# Patient Record
Sex: FEMALE | Race: WHITE | Employment: OTHER | URBAN - METROPOLITAN AREA
[De-identification: names, ages, dates, MRNs, and addresses within clinical notes are randomized per-mention and may not be internally consistent; named-entity substitution may affect disease eponyms.]

---

## 2017-07-07 ENCOUNTER — HOSPITAL ENCOUNTER (INPATIENT)
Age: 67
LOS: 4 days | Discharge: HOME | DRG: 884 | End: 2017-07-11
Attending: PSYCHIATRY & NEUROLOGY | Admitting: PSYCHIATRY & NEUROLOGY
Payer: MEDICARE

## 2017-07-07 PROBLEM — N39.0 UTI (URINARY TRACT INFECTION): Status: ACTIVE | Noted: 2017-07-07

## 2017-07-07 PROBLEM — F25.9 SCHIZOAFFECTIVE DISORDER (HCC): Status: ACTIVE | Noted: 2017-07-07

## 2017-07-07 LAB
GLUCOSE BLD STRIP.AUTO-MCNC: 141 MG/DL (ref 65–100)
SERVICE CMNT-IMP: ABNORMAL

## 2017-07-07 PROCEDURE — 82962 GLUCOSE BLOOD TEST: CPT

## 2017-07-07 PROCEDURE — 74011250637 HC RX REV CODE- 250/637: Performed by: HOSPITALIST

## 2017-07-07 PROCEDURE — 65220000003 HC RM SEMIPRIVATE PSYCH

## 2017-07-07 PROCEDURE — 74011250637 HC RX REV CODE- 250/637: Performed by: PSYCHIATRY & NEUROLOGY

## 2017-07-07 PROCEDURE — 74011250636 HC RX REV CODE- 250/636: Performed by: PSYCHIATRY & NEUROLOGY

## 2017-07-07 RX ORDER — NITROFURANTOIN 25; 75 MG/1; MG/1
100 CAPSULE ORAL 2 TIMES DAILY
Status: DISCONTINUED | OUTPATIENT
Start: 2017-07-07 | End: 2017-07-11 | Stop reason: HOSPADM

## 2017-07-07 RX ORDER — BISMUTH SUBSALICYLATE 262 MG
1 TABLET,CHEWABLE ORAL DAILY
COMMUNITY

## 2017-07-07 RX ORDER — ATORVASTATIN CALCIUM 40 MG/1
40 TABLET, FILM COATED ORAL DAILY
Status: DISCONTINUED | OUTPATIENT
Start: 2017-07-08 | End: 2017-07-11 | Stop reason: HOSPADM

## 2017-07-07 RX ORDER — THERA TABS 400 MCG
1 TAB ORAL DAILY
Status: DISCONTINUED | OUTPATIENT
Start: 2017-07-08 | End: 2017-07-11 | Stop reason: HOSPADM

## 2017-07-07 RX ORDER — CHLORPROMAZINE HYDROCHLORIDE 25 MG/1
25 TABLET, FILM COATED ORAL 3 TIMES DAILY
COMMUNITY

## 2017-07-07 RX ORDER — BENZTROPINE MESYLATE 1 MG/1
1 TABLET ORAL
Status: DISCONTINUED | OUTPATIENT
Start: 2017-07-07 | End: 2017-07-11 | Stop reason: HOSPADM

## 2017-07-07 RX ORDER — LORAZEPAM 2 MG/ML
0.5 INJECTION INTRAMUSCULAR
Status: DISCONTINUED | OUTPATIENT
Start: 2017-07-07 | End: 2017-07-11 | Stop reason: HOSPADM

## 2017-07-07 RX ORDER — GUAIFENESIN 100 MG/5ML
81 LIQUID (ML) ORAL DAILY
COMMUNITY

## 2017-07-07 RX ORDER — IBUPROFEN 200 MG
1 TABLET ORAL
Status: DISCONTINUED | OUTPATIENT
Start: 2017-07-07 | End: 2017-07-11 | Stop reason: HOSPADM

## 2017-07-07 RX ORDER — GLIPIZIDE 5 MG/1
5 TABLET ORAL DAILY
Status: DISCONTINUED | OUTPATIENT
Start: 2017-07-07 | End: 2017-07-11 | Stop reason: HOSPADM

## 2017-07-07 RX ORDER — ZOLPIDEM TARTRATE 5 MG/1
5 TABLET ORAL
Status: DISCONTINUED | OUTPATIENT
Start: 2017-07-07 | End: 2017-07-11 | Stop reason: HOSPADM

## 2017-07-07 RX ORDER — LANOLIN ALCOHOL/MO/W.PET/CERES
325 CREAM (GRAM) TOPICAL
Status: DISCONTINUED | OUTPATIENT
Start: 2017-07-08 | End: 2017-07-11 | Stop reason: HOSPADM

## 2017-07-07 RX ORDER — OLANZAPINE 2.5 MG/1
2.5 TABLET ORAL
Status: DISCONTINUED | OUTPATIENT
Start: 2017-07-07 | End: 2017-07-11 | Stop reason: HOSPADM

## 2017-07-07 RX ORDER — BENZTROPINE MESYLATE 1 MG/ML
1 INJECTION INTRAMUSCULAR; INTRAVENOUS
Status: DISCONTINUED | OUTPATIENT
Start: 2017-07-07 | End: 2017-07-11 | Stop reason: HOSPADM

## 2017-07-07 RX ORDER — GLIPIZIDE 5 MG/1
5 TABLET ORAL DAILY
Status: DISCONTINUED | OUTPATIENT
Start: 2017-07-08 | End: 2017-07-07

## 2017-07-07 RX ORDER — MAGNESIUM SULFATE 100 %
4 CRYSTALS MISCELLANEOUS AS NEEDED
Status: DISCONTINUED | OUTPATIENT
Start: 2017-07-07 | End: 2017-07-11 | Stop reason: HOSPADM

## 2017-07-07 RX ORDER — INSULIN LISPRO 100 [IU]/ML
INJECTION, SOLUTION INTRAVENOUS; SUBCUTANEOUS
Status: DISCONTINUED | OUTPATIENT
Start: 2017-07-07 | End: 2017-07-11 | Stop reason: HOSPADM

## 2017-07-07 RX ORDER — ADHESIVE BANDAGE
30 BANDAGE TOPICAL DAILY PRN
Status: DISCONTINUED | OUTPATIENT
Start: 2017-07-07 | End: 2017-07-11 | Stop reason: HOSPADM

## 2017-07-07 RX ORDER — LORAZEPAM 0.5 MG/1
0.5 TABLET ORAL
Status: DISCONTINUED | OUTPATIENT
Start: 2017-07-07 | End: 2017-07-11 | Stop reason: HOSPADM

## 2017-07-07 RX ORDER — DEXTROSE 50 % IN WATER (D50W) INTRAVENOUS SYRINGE
12.5-25 AS NEEDED
Status: DISCONTINUED | OUTPATIENT
Start: 2017-07-07 | End: 2017-07-07

## 2017-07-07 RX ORDER — MEMANTINE HYDROCHLORIDE 10 MG/1
10 TABLET ORAL 2 TIMES DAILY
Status: DISCONTINUED | OUTPATIENT
Start: 2017-07-07 | End: 2017-07-11 | Stop reason: HOSPADM

## 2017-07-07 RX ORDER — GLIPIZIDE 5 MG/1
5 TABLET ORAL DAILY
COMMUNITY

## 2017-07-07 RX ORDER — IBUPROFEN 400 MG/1
400 TABLET ORAL
Status: DISCONTINUED | OUTPATIENT
Start: 2017-07-07 | End: 2017-07-11 | Stop reason: HOSPADM

## 2017-07-07 RX ORDER — GUAIFENESIN 100 MG/5ML
81 LIQUID (ML) ORAL DAILY
Status: DISCONTINUED | OUTPATIENT
Start: 2017-07-08 | End: 2017-07-11 | Stop reason: HOSPADM

## 2017-07-07 RX ORDER — CHLORPROMAZINE HYDROCHLORIDE 25 MG/1
25 TABLET, FILM COATED ORAL 3 TIMES DAILY
Status: DISCONTINUED | OUTPATIENT
Start: 2017-07-07 | End: 2017-07-07

## 2017-07-07 RX ORDER — CHLORPROMAZINE HYDROCHLORIDE 25 MG/1
25 TABLET, FILM COATED ORAL 2 TIMES DAILY
Status: DISCONTINUED | OUTPATIENT
Start: 2017-07-07 | End: 2017-07-11 | Stop reason: HOSPADM

## 2017-07-07 RX ORDER — MEMANTINE HYDROCHLORIDE 10 MG/1
10 TABLET ORAL 2 TIMES DAILY
Status: ON HOLD | COMMUNITY
End: 2017-07-07

## 2017-07-07 RX ORDER — DONEPEZIL HYDROCHLORIDE 10 MG/1
10 TABLET, FILM COATED ORAL
Status: DISCONTINUED | OUTPATIENT
Start: 2017-07-07 | End: 2017-07-11 | Stop reason: HOSPADM

## 2017-07-07 RX ADMIN — MEMANTINE HYDROCHLORIDE 10 MG: 10 TABLET ORAL at 12:58

## 2017-07-07 RX ADMIN — LORAZEPAM 0.5 MG: 2 INJECTION INTRAMUSCULAR; INTRAVENOUS at 20:44

## 2017-07-07 RX ADMIN — NITROFURANTOIN MONOHYDRATE/MACROCRYSTALLINE 100 MG: 25; 75 CAPSULE ORAL at 12:58

## 2017-07-07 NOTE — PROGRESS NOTES
08:25  Patient admitted to Inpatient geriatric psychiatry unit       Patient denies suicidal ideations  Patient endorses homicidal ideations however cannot say to whom or how. Patient  verbally contracts for safety  Patient is disorganized, circumstantial with flight of ideas. Patient appears to be a poor historian, most of admission information obtained from spouse who reports POA status (asked to bring in paperwork for chart).      Primary Nurse Adrian Wilde and Faizan Granger RN performed a dual skin assessment on this patient Impairment noted- see wound doc flow sheet  Tarun score is 23

## 2017-07-07 NOTE — H&P
INITIAL PSYCHIATRIC EVALUATION         IDENTIFICATION:    Patient Name  Joe Ivey   Date of Birth 1950   [facility] [id number]   Medical Record Number  [id number]      Age  74 y.o. PCP Smith Perez MD   Admit date:  7/7/2017    Room Number  748/01  @ [facility]   Date of Service  7/7/2017            HISTORY         REASON FOR HOSPITALIZATION:  CC: \"cognitive decline and aggression in serttng of \". Pt admitted under a voluntary basis for advancing cerebrovascular dementia with behavioral disturbance  an inability to care for self. HISTORY OF PRESENT ILLNESS:    The patient, Joe Ivey, is a 74 y.o. WHITE [place]  female with a past psychiatric history significant for Dementia, who presents at this time with complaints of (and/or evidence of) the following emotional symptoms: depression. Additional symptomatology include behavioral disturbance and advancing dementia. The above symptoms have been present for years. These symptoms are of severe severity. These symptoms are constant  fleeting in nature. The patient's condition has been precipitated by a UTI and psychosocial stressors (marital problems ). Patient's condition made worse by treatment noncompliance. UDS: negative; BAL=0.     ALLERGIES:  Allergies   Allergen Reactions    Ancef [Cefazolin] Unknown (comments)      could not report allergic reaction    Antihistamine Decongestant [Triprolidine-Pseudoephedrine] Other (comments)      could not report allergic reaction    Antihistamine [Diphenhydramine Hcl] Other (comments)      could not report allergic reaction    Bactrim [Sulfamethoprim] Unknown (comments)      could not report allergic reaction    Benicar [Olmesartan] Unknown (comments)      could not report allergic reaction    Benzocaine Unknown (comments)      could not report allergic reaction    Captopril Unknown (comments)      could not report allergic reaction    Codeine Phosphate Unknown (comments)      could not report allergic reaction    Codeine Sulfate Unknown (comments)      could not report allergic reaction    Demerol [Meperidine] Unknown (comments)      could not report allergic reaction    Dilantin [Phenytoin Sodium Extended] Unknown (comments)      could not report allergic reaction    Diovan [Valsartan] Unknown (comments)      could not report allergic reaction    Doxycycline Unknown (comments)      could not report allergic reaction    Lortab [Hydrocodone-Acetaminophen] Unknown (comments)      could not report allergic reaction    Metformin Unknown (comments)      could not report allergic reaction    Norvasc [Amlodipine] Unknown (comments)      could not report allergic reaction    Parnate [Tranylcypromine] Unknown (comments)      could not report allergic reaction    Penicillins Unknown (comments)      could not report allergic reaction    Percocet [Oxycodone-Acetaminophen] Unknown (comments)      could not report allergic reaction    Pravastatin Other (comments)     Headaches    Robaxin [Methocarbamol] Other (comments)      could not report allergic reaction      MEDICATIONS PRIOR TO ADMISSION:  Prescriptions Prior to Admission   Medication Sig    glipiZIDE (GLUCOTROL) 5 mg tablet Take 5 mg by mouth daily. Indications: type 2 diabetes mellitus    multivitamin (ONE A DAY) tablet Take 1 Tab by mouth daily.  FERROUS SULFATE (IRON PO) Take 1 Tab by mouth daily.  chlorproMAZINE (THORAZINE) 25 mg tablet Take 25 mg by mouth three (3) times daily.  memantine-donepezil (NAMZARIC) 28-10 mg CSpX Take 1 Cap by mouth daily. Indications: DEMENTIA    aspirin 81 mg chewable tablet Take 1 Tab by mouth daily. Indications: anxiety    atorvastatin (LIPITOR) 40 mg tablet Take 1 Tab by mouth daily.  Indications: PREVENTION OF CEREBROVASCULAR ACCIDENT      PAST MEDICAL HISTORY:  Past Medical History:   Diagnosis Date    Chronic kidney disease     stones in past    Diabetes ([address]     Hypertension     Psychiatric disorder     depression     Past Surgical History:   Procedure Laterality Date    HX OTHER SURGICAL      hysterectomy    HX OTHER SURGICAL      gallbladder    HX OTHER SURGICAL      elbows - bilateral screw placement      SOCIAL HISTORY:    Social History     Social History    Marital status:      Spouse name: N/A    Number of children: N/A    Years of education: N/A     Occupational History    Not on file. Social History Main Topics    Smoking status: Never Smoker    Smokeless tobacco: Never Used    Alcohol use No    Drug use: No    Sexual activity: Yes     Partners: Male     Other Topics Concern    Not on file     Social History Narrative    [id number] IMPAIRMENT IN THE CONTEXT OF WORSENING CEREBROVASCULAR DEMENTIA. Patient has had acute mental status change in addition, most likely due to UTI. During past [facility] admissions, 's family, Nursing Director, Social Work, Patient Advocate and her attending psychiatirst ALL concurred that patient needed placement in a SNF. Patient's  was against this recommendation because he believes the patient only needs psychotherapy, and doies not need medications. FAMILY HISTORY: History reviewed. No pertinent family history. Family History   Problem Relation Age of Onset    Diabetes Mother     Diabetes Father        REVIEW OF SYSTEMS:   Psychological ROS: positive for - dementia  Respiratory ROS: no cough, shortness of breath, or wheezing  Cardiovascular ROS: no chest pain or dyspnea on exertion  Pertinent items are noted in the History of Present Illness. All other Systems reviewed and are considered negative.            MENTAL STATUS EXAM & VITALS         MENTAL STATUS EXAM (MSE): MSE FINDINGS ARE WITHIN NORMAL LIMITS (WNL) UNLESS OTHERWISE STATED BELOW. ( ALL OF THE BELOW CATEGORIES OF THE MSE HAVE BEEN REVIEWED (reviewed 7/7/2017) AND UPDATED AS DEEMED APPROPRIATE )  General Presentation age appropriate, evasive   Orientation Alert and Oriented x 1   Vital Signs  See below (reviewed 7/7/2017); Vital Signs (BP, Pulse, & Temp) are within normal limits if not listed below. Gait and Station Stable/steady, no ataxia   Musculoskeletal System No extrapyramidal symptoms (EPS); no abnormal muscular movements or Tardive Dyskinesia (TD); muscle strength and tone are within normal limits   Language No aphasia or dysarthria   Speech:  hypoverbal   Thought Processes concrete; slow rate of thoughts; poor abstract reasoning/computation   Thought Associations blocked    Thought Content paranoid delusions   Suicidal Ideations none   Homicidal Ideations none   Mood:  hostile  and irritable   Affect:  mood-congruent   Memory recent  impaired   Memory remote:  impaired   Concentration/Attention:  poor   Fund of Knowledge below average   Insight:  poor   Reliability poor   Judgment:  poor            VITALS:     Patient Vitals for the past 24 hrs:   Temp Pulse Resp BP SpO2   07/07/17 0825 97.6 °F (36.4 °C) 82 16 127/84 99 %     Wt Readings from Last 3 Encounters:   11/13/16 68.8 kg (151 lb 9.6 oz)     Temp Readings from Last 3 Encounters:   07/07/17 97.6 °F (36.4 °C)   11/18/16 98.1 °F (36.7 °C)   10/30/15 98.4 °F (36.9 °C)     BP Readings from Last 3 Encounters:   07/07/17 127/84   11/18/16 123/81   10/30/15 120/75     Pulse Readings from Last 3 Encounters:   07/07/17 82   11/18/16 92   10/30/15 99            DATA       LABORATORY DATA:  Labs Reviewed - No data to display  No visits with results within 2 Day(s) from this visit. Latest known visit with results is:    Admission on 10/19/2016, Discharged on 11/18/2016   No results displayed because visit has over 200 results.            RADIOLOGY REPORTS:    Results from Hospital Encounter encounter on 10/19/16   XR CHEST PORT   Narrative INDICATION:  AMS     EXAM: Chest single view. COMPARISON: None. Sandra Clement FINDINGS: A single frontal view of the chest at 1525 hours shows clear lungs. The left lung apex is partially obscured by the patient's chin. The heart,  mediastinum and pulmonary vasculature are normal    .  The bony thorax is  unremarkable for age. .         Impression IMPRESSION:  No acute cardiopulmonary disease radiographically. .  . No results found.            MEDICATIONS       ALL MEDICATIONS  Current Facility-Administered Medications   Medication Dose Route Frequency    nitrofurantoin (macrocrystal-monohydrate) (MACROBID) capsule 100 mg  100 mg Oral BID    OLANZapine (ZyPREXA) tablet 2.5 mg  2.5 mg Oral Q6H PRN    ziprasidone (GEODON) 10 mg in sterile water (preservative free) 0.5 mL injection  10 mg IntraMUSCular BID PRN    benztropine (COGENTIN) tablet 1 mg  1 mg Oral BID PRN    benztropine (COGENTIN) injection 1 mg  1 mg IntraMUSCular BID PRN    LORazepam (ATIVAN) injection 0.5 mg  0.5 mg IntraMUSCular Q4H PRN    LORazepam (ATIVAN) tablet 0.5 mg  0.5 mg Oral Q4H PRN    zolpidem (AMBIEN) tablet 5 mg  5 mg Oral QHS PRN    ibuprofen (MOTRIN) tablet 400 mg  400 mg Oral Q8H PRN    magnesium hydroxide (MILK OF MAGNESIA) 400 mg/5 mL oral suspension 30 mL  30 mL Oral DAILY PRN    nicotine (NICODERM CQ) 21 mg/24 hr patch 1 Patch  1 Patch TransDERmal DAILY PRN    [START ON 7/8/2017] atorvastatin (LIPITOR) tablet 40 mg  40 mg Oral DAILY    [START ON 7/8/2017] aspirin chewable tablet 81 mg  81 mg Oral DAILY    [START ON 7/8/2017] therapeutic multivitamin (THERAGRAN) tablet 1 Tab  1 Tab Oral DAILY    [START ON 7/8/2017] ferrous sulfate tablet 325 mg  325 mg Oral DAILY WITH BREAKFAST    insulin lispro (HUMALOG) injection   SubCUTAneous AC&HS    glucose chewable tablet 16 g  4 Tab Oral PRN    glucagon (GLUCAGEN) injection 1 mg  1 mg IntraMUSCular PRN    glipiZIDE (GLUCOTROL) tablet 5 mg  5 mg Oral DAILY    donepezil (ARICEPT) tablet 10 mg  10 mg Oral QHS    memantine (NAMENDA) tablet 10 mg  10 mg Oral BID    dextrose 10 % infusion 125-250 mL  125-250 mL IntraVENous PRN      SCHEDULED MEDICATIONS  Current Facility-Administered Medications   Medication Dose Route Frequency    nitrofurantoin (macrocrystal-monohydrate) (MACROBID) capsule 100 mg  100 mg Oral BID    [START ON 7/8/2017] atorvastatin (LIPITOR) tablet 40 mg  40 mg Oral DAILY    [START ON 7/8/2017] aspirin chewable tablet 81 mg  81 mg Oral DAILY    [START ON 7/8/2017] therapeutic multivitamin (THERAGRAN) tablet 1 Tab  1 Tab Oral DAILY    [START ON 7/8/2017] ferrous sulfate tablet 325 mg  325 mg Oral DAILY WITH BREAKFAST    insulin lispro (HUMALOG) injection   SubCUTAneous AC&HS    glipiZIDE (GLUCOTROL) tablet 5 mg  5 mg Oral DAILY    donepezil (ARICEPT) tablet 10 mg  10 mg Oral QHS    memantine (NAMENDA) tablet 10 mg  10 mg Oral BID                ASSESSMENT & PLAN        The patient Ulysses Sandoval is a 74 y.o.  female who presents at this time for treatment of the following diagnoses:  Patient Active Hospital Problem List:   Dementia (10/19/2016)    Assessment: cognitive decline with verbal and problem solving deficits, due to cerebrovascular DZ    Plan: Namzeric   Schizoaffective disorder ([facility] [address] (7/7/2017)    Assessment: cameron/ depression alternating with psychosis    Plan: christiano          In summary, Ulysses Sandoval presents with a severe exacerbation of the principal diagnosis, Dementia    While on the unit Ulysses Sandoval will be provided with individual, milieu, occupational, group, and substance abuse therapies to address target symptoms as deemed appropriate for the individual patient. I agree with decision to admit patient.  I have spoken to [facility] psychiatric /ED staff regarding the nature of patients's admission at this time. A coordinated, multidisplinary treatment team (includes the nurse, unit pharmcist,  and writer) round was conducted for this initial evaluation with the patient present. The following regarding medications was addressed during rounds with patient:   the risks and benefits of the proposed medication. The patient was given the opportunity to ask questions. Informed consent given to the use of the above medications. I will continue to adjust psychiatric and non-psychiatric medications (see above \"medication\" section and orders section for details) as deemed appropriate & based upon diagnoses and response to treatment. I have reviewed admission (and previous/old) labs and medical tests in the EHR and or transferring hospital documents. I will continue to order blood tests/labs and diagnostic tests as deemed appropriate and review results as they become available (see orders for details). I have reviewed old psychiatric and medical records available in the EHR. I Will order additional psychiatric records from other institutions to further elucidate the nature of patient's psychopathology and review once available. I will gather additional collateral information from friends, family and o/p treatment team to further elucidate the nature of patient's psychopathology and baselline level of psychiatric functioning.         ESTIMATED LENGTH OF STAY:   1-2 days       STRENGTHS:  Access to housing/residential stability and Interpersonal/supportive relationships (family, friends, peers)                      SIGNED:    Todd Jolly MD  7/7/2017

## 2017-07-07 NOTE — PROGRESS NOTES
TRANSFER - IN REPORT:    Verbal report received from Dot RN(name) on Mario Casanova DAVID Lorenzo Du  being received from [email or web address] ED(unit) for routine progression of care      Report consisted of patients Situation, Background, Assessment and   Recommendations(SBAR). Information from the following report(s) SBAR and ED Summary was reviewed with the receiving nurse. Opportunity for questions and clarification was provided. Assessment completed upon patients arrival to unit and care assumed.

## 2017-07-07 NOTE — IP AVS SNAPSHOT
Current Discharge Medication List  
  
START taking these medications Dose & Instructions Dispensing Information Comments Morning Noon Evening Bedtime  
 nitrofurantoin (macrocrystal-monohydrate) 100 mg capsule Commonly known as:  MACROBID Your last dose was: Your next dose is:    
   
   
 Dose:  100 mg Take 1 Cap by mouth two (2) times a day. Indications: BACTERIAL URINARY TRACT INFECTION Quantity:  6 Cap Refills:  0 CONTINUE these medications which have CHANGED Dose & Instructions Dispensing Information Comments Morning Noon Evening Bedtime  
 aspirin 81 mg chewable tablet What changed:  Another medication with the same name was removed. Continue taking this medication, and follow the directions you see here. Your last dose was: Your next dose is:    
   
   
 Dose:  81 mg Take 81 mg by mouth daily. Indications: prevention of cerebrovascular accident Refills:  0 CONTINUE these medications which have NOT CHANGED Dose & Instructions Dispensing Information Comments Morning Noon Evening Bedtime  
 atorvastatin 40 mg tablet Commonly known as:  LIPITOR Your last dose was: Your next dose is:    
   
   
 Dose:  40 mg Take 1 Tab by mouth daily. Indications: PREVENTION OF CEREBROVASCULAR ACCIDENT Quantity:  15 Tab Refills:  0  
     
   
   
   
  
 chlorproMAZINE 25 mg tablet Commonly known as:  THORAZINE Your last dose was: Your next dose is:    
   
   
 Dose:  25 mg Take 25 mg by mouth three (3) times daily. Refills:  0  
     
   
   
   
  
 glipiZIDE 5 mg tablet Commonly known as:  Raf Mitchellr Your last dose was: Your next dose is:    
   
   
 Dose:  5 mg Take 5 mg by mouth daily. Indications: type 2 diabetes mellitus Refills:  0 IRON PO Your last dose was: Your next dose is: Dose:  1 Tab Take 1 Tab by mouth daily. Refills:  0  
     
   
   
   
  
 multivitamin tablet Commonly known as:  ONE A DAY Your last dose was: Your next dose is:    
   
   
 Dose:  1 Tab Take 1 Tab by mouth daily. Refills:  0 NAMZARIC 28-10 mg Cspx Generic drug:  memantine-donepezil Your last dose was: Your next dose is:    
   
   
 Dose:  1 Cap Take 1 Cap by mouth daily. Indications: DEMENTIA Refills:  0 Where to Get Your Medications Information on where to get these meds will be given to you by the nurse or doctor. ! Ask your nurse or doctor about these medications  
  nitrofurantoin (macrocrystal-monohydrate) 100 mg capsule

## 2017-07-07 NOTE — IP AVS SNAPSHOT
[address] 
[phone] Patient: Alex Hong MRN: [id number] :1950 You are allergic to the following Allergen Reactions Ancef (Cefazolin) Unknown (comments)  could not report allergic reaction Antihistamine Decongestant (Triprolidine-Pseudoephedrine) Other (comments)  could not report allergic reaction Antihistamine (Diphenhydramine Hcl) Other (comments)  could not report allergic reaction Bactrim (Sulfamethoprim) Unknown (comments)  could not report allergic reaction Benicar (Olmesartan) Unknown (comments)  could not report allergic reaction Benzocaine Unknown (comments)  could not report allergic reaction Captopril Unknown (comments)  could not report allergic reaction Codeine Phosphate Unknown (comments)  could not report allergic reaction Codeine Sulfate Unknown (comments)  could not report allergic reaction Demerol (Meperidine) Unknown (comments)  could not report allergic reaction Dilantin (Phenytoin Sodium Extended) Unknown (comments)  could not report allergic reaction Diovan (Valsartan) Unknown (comments)  could not report allergic reaction Doxycycline Unknown (comments)  could not report allergic reaction Lortab (Hydrocodone-Acetaminophen) Unknown (comments)  could not report allergic reaction Metformin Unknown (comments)  could not report allergic reaction Norvasc (Amlodipine) Unknown (comments)  could not report allergic reaction Parnate (Tranylcypromine) Unknown (comments)  could not report allergic reaction Penicillins Unknown (comments)  could not report allergic reaction Percocet (Oxycodone-Acetaminophen) Unknown (comments)  could not report allergic reaction Pravastatin Other (comments) Headaches Robaxin (Methocarbamol) Other (comments)  could not report allergic reaction Recent Documentation Weight Breastfeeding? BMI OB Status Smoking Status 70 kg No 28.24 kg/m2 Postmenopausal Never Smoker Emergency Contacts Name Discharge Info Relation Home Work Mobile Laura Zamarripa  Spouse [3] [phone] About your hospitalization You were admitted on:  July 7, 2017 You last received care in the:  [address] [place] [address] You were discharged on:  July 11, 2017 Unit phone number:  [phone] Why you were hospitalized Your primary diagnosis was:  Dementia Your diagnoses also included:  Schizoaffective Disorder (Hcc), Uti (Urinary Tract Infection) Providers Seen During Your Hospitalizations Provider Role Specialty Primary office phone Sean Chow MD Attending Provider Psychiatry [phone] Clif Billings MD Attending Provider Psychiatry [phone] Your Primary Care Physician (PCP) Primary Care Physician Office Phone Office Fax Rashard Gallardo [phone] Follow-up Information Follow up With Details Comments Contact Info Ramona Jones MD Schedule an appointment as soon as possible for a visit  [address] [facility] [address] [place] [address] 
[phone] Current Discharge Medication List  
  
START taking these medications Dose & Instructions Dispensing Information Comments Morning Noon Evening Bedtime  
 nitrofurantoin (macrocrystal-monohydrate) 100 mg capsule Commonly known as:  MACROBID Your last dose was: Your next dose is:    
   
   
 Dose:  100 mg Take 1 Cap by mouth two (2) times a day. Indications: BACTERIAL URINARY TRACT INFECTION Quantity:  6 Cap Refills:  0 CONTINUE these medications which have CHANGED Dose & Instructions Dispensing Information Comments Morning Noon Evening Bedtime  
 aspirin 81 mg chewable tablet What changed:  Another medication with the same name was removed. Continue taking this medication, and follow the directions you see here. Your last dose was: Your next dose is:    
   
   
 Dose:  81 mg Take 81 mg by mouth daily. Indications: prevention of cerebrovascular accident Refills:  0 CONTINUE these medications which have NOT CHANGED Dose & Instructions Dispensing Information Comments Morning Noon Evening Bedtime  
 atorvastatin 40 mg tablet Commonly known as:  LIPITOR Your last dose was: Your next dose is:    
   
   
 Dose:  40 mg Take 1 Tab by mouth daily. Indications: PREVENTION OF CEREBROVASCULAR ACCIDENT Quantity:  15 Tab Refills:  0  
     
   
   
   
  
 chlorproMAZINE 25 mg tablet Commonly known as:  THORAZINE Your last dose was: Your next dose is:    
   
   
 Dose:  25 mg Take 25 mg by mouth three (3) times daily. Refills:  0  
     
   
   
   
  
 glipiZIDE 5 mg tablet Commonly known as:  Dorimisael Petersono Your last dose was: Your next dose is:    
   
   
 Dose:  5 mg Take 5 mg by mouth daily. Indications: type 2 diabetes mellitus Refills:  0 IRON PO Your last dose was: Your next dose is:    
   
   
 Dose:  1 Tab Take 1 Tab by mouth daily. Refills:  0  
     
   
   
   
  
 multivitamin tablet Commonly known as:  ONE A DAY Your last dose was: Your next dose is:    
   
   
 Dose:  1 Tab Take 1 Tab by mouth daily. Refills:  0 NAMZARIC 28-10 mg Cspx Generic drug:  memantine-donepezil Your last dose was: Your next dose is:    
   
   
 Dose:  1 Cap Take 1 Cap by mouth daily. Indications: DEMENTIA Refills:  0 Where to Get Your Medications Information on where to get these meds will be given to you by the nurse or doctor. ! Ask your nurse or doctor about these medications  
  nitrofurantoin (macrocrystal-monohydrate) 100 mg capsule Discharge Instructions DISCHARGE SUMMARY 
 
[address] [facility] : 1950 MRN: [id number] The patient Lakisha Pham exhibits the ability to control behavior in a less restrictive environment. Patient's level of functioning is improving. No assaultive/destructive behavior has been observed for the past 24 hours. No suicidal/homicidal threat or behavior has been observed for the past 24 hours. There is no evidence of serious medication side effects. Patient has not been in physical or protective restraints for at least the past 24 hours. If weapons involved, how are they secured? No weapons involved Is patient aware of and in agreement with discharge plan?  is aware - patient is confused Arrangements for medication:  Prescriptions given to patient. Copy of discharge instructions to  provider?:  Yes - sent to PCP Arrangements for transportation home:   to  Keep all follow up appointments as scheduled, continue to take prescribed medications per physician instructions. Mental health crisis number:  022 or your local mental health crisis line number at [phone] DISCHARGE SUMMARY from Nurse The following personal items are in your possession at time of discharge: 
 
Dental Appliances: None Visual Aid: Glasses, With patient Home Medications: None Jewelry: None Clothing: None Other Valuables: Personal toiletries (shampoo) Personal Items Sent to Safe: None PATIENT INSTRUCTIONS: 
 
What to do at Home: 
Recommended activity: Activity as tolerated. If you experience any of the following symptoms:  Overwhelming anxiety or depression, thoughts of hurting yourself or others, please follow up with 911 or your local mental health crisis line number at [phone]. *  Please give a list of your current medications to your Primary Care Provider. *  Please update this list whenever your medications are discontinued, doses are 
    changed, or new medications (including over-the-counter products) are added. *  Please carry medication information at all times in case of emergency situations. These are general instructions for a healthy lifestyle: No smoking/ No tobacco products/ Avoid exposure to second hand smoke Surgeon General's Warning:  Quitting smoking now greatly reduces serious risk to your health. Obesity, smoking, and sedentary lifestyle greatly increases your risk for illness A healthy diet, regular physical exercise & weight monitoring are important for maintaining a healthy lifestyle You may be retaining fluid if you have a history of heart failure or if you experience any of the following symptoms:  Weight gain of 3 pounds or more overnight or 5 pounds in a week, increased swelling in our hands or feet or shortness of breath while lying flat in bed. Please call your doctor as soon as you notice any of these symptoms; do not wait until your next office visit. Recognize signs and symptoms of STROKE: 
 
F-face looks uneven A-arms unable to move or move unevenly S-speech slurred or non-existent T-time-call 911 as soon as signs and symptoms begin-DO NOT go Back to bed or wait to see if you get better-TIME IS BRAIN. Warning Signs of HEART ATTACK Call 911 if you have these symptoms: 
? Chest discomfort. Most heart attacks involve discomfort in the center of the chest that lasts more than a few minutes, or that goes away and comes back. It can feel like uncomfortable pressure, squeezing, fullness, or pain. ? Discomfort in other areas of the upper body. Symptoms can include pain or discomfort in one or both arms, the back, neck, jaw, or stomach. ? Shortness of breath with or without chest discomfort. ? Other signs may include breaking out in a cold sweat, nausea, or lightheadedness. Don't wait more than five minutes to call [address]! Fast action can save your life. Calling 911 is almost always the fastest way to get lifesaving treatment. Emergency Medical Services staff can begin treatment when they arrive  up to an hour sooner than if someone gets to the hospital by car. The discharge information has been reviewed with the patient and spouse. The {PATIENT PARENT GUARDIAN:93993} verbalized understanding. Discharge medications reviewed with the {Dishcarge meds reviewed PIJU:[id number]} and appropriate educational materials and side effects teaching were provided. Discharge Orders None Datacratic Announcement We are excited to announce that we are making your provider's discharge notes available to you in Datacratic. You will see these notes when they are completed and signed by the physician that discharged you from your recent hospital stay. If you have any questions or concerns about any information you see in Datacratic, please call the Health Information Department where you were seen or reach out to your Primary Care Provider for more information about your plan of care. Introducing [facility]! [facility] introduces Datacratic patient portal. Now you can access parts of your medical record, email your doctor's office, and request medication refills online. 1. In your internet browser, go to [email or web address] 2. Click on the First Time User? Click Here link in the Sign In box. You will see the New Member Sign Up page. 3. Enter your Datacratic Access Code exactly as it appears below.  You will not need to use this code after youve completed the sign-up process. If you do not sign up before the expiration date, you must request a new code. · TrustTeam Access Code: [email or web address] Expires: 10/9/2017  2:02 PM 
 
4. Enter the last four digits of your Social Security Number (xxxx) and Date of Birth (mm/dd/yyyy) as indicated and click Submit. You will be taken to the next sign-up page. 5. Create a TrustTeam ID. This will be your TrustTeam login ID and cannot be changed, so think of one that is secure and easy to remember. 6. Create a TrustTeam password. You can change your password at any time. 7. Enter your Password Reset Question and Answer. This can be used at a later time if you forget your password. 8. Enter your e-mail address. You will receive e-mail notification when new information is available in [address] 9. Click Sign Up. You can now view and download portions of your medical record. 10. Click the Download Summary menu link to download a portable copy of your medical information. If you have questions, please visit the Frequently Asked Questions section of the TrustTeam website. Remember, TrustTeam is NOT to be used for urgent needs. For medical emergencies, dial 911. Now available from your iPhone and Android! General Information Please provide this summary of care documentation to your next provider. Patient Signature:  ____________________________________________________________ Date:  ____________________________________________________________  
  
Linda Lucas Provider Signature:  ____________________________________________________________ Date:  ____________________________________________________________
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
## 2017-07-07 NOTE — CONSULTS
Medical H and P/Clearance for Psychiatry    Primary Care Provider: Devi Patel MD  Source of Information: Patient     History of Presenting Illness:   Darcy Gunderson is a 74 y.o. female with PMhx of DM, Diet Controlled HTN, Depression, Dementia admitted for psychosis. We are consulted for medical management. Pt  Confused, poor historian. No reports of fever, chills, chest pain, sob, n/v/d, abdominal pain, dysuria. Review of Systems:  Review of systems not obtained due to patient factors. Past Medical History:   Diagnosis Date    Chronic kidney disease     stones in past    Diabetes ([facility]     Hypertension     Psychiatric disorder     depression      Past Surgical History:   Procedure Laterality Date    HX OTHER SURGICAL      hysterectomy    HX OTHER SURGICAL      gallbladder    HX OTHER SURGICAL      elbows - bilateral screw placement     Prior to Admission medications    Medication Sig Start Date End Date Taking? Authorizing Provider   glipiZIDE (GLUCOTROL) 5 mg tablet Take 5 mg by mouth daily. Indications: type 2 diabetes mellitus   Yes Historical Provider   multivitamin (ONE A DAY) tablet Take 1 Tab by mouth daily. Yes Historical Provider   FERROUS SULFATE (IRON PO) Take 1 Tab by mouth daily. Yes Historical Provider   aspirin 81 mg chewable tablet Take 81 mg by mouth daily. Indications: prevention of cerebrovascular accident   Yes Historical Provider   chlorproMAZINE (THORAZINE) 25 mg tablet Take 25 mg by mouth three (3) times daily. Historical Provider   memantine-donepezil [facility]) 28-10 mg CSpX Take 1 Cap by mouth daily. Indications: DEMENTIA    Historical Provider   atorvastatin (LIPITOR) 40 mg tablet Take 1 Tab by mouth daily.  Indications: PREVENTION OF CEREBROVASCULAR ACCIDENT 11/18/16   Valeria Isaac MD     Allergies   Allergen Reactions    Ancef [Cefazolin] Unknown (comments)      could not report allergic reaction    Antihistamine Decongestant [Triprolidine-Pseudoephedrine] Other (comments)      could not report allergic reaction    Antihistamine [Diphenhydramine Hcl] Other (comments)      could not report allergic reaction    Bactrim [Sulfamethoprim] Unknown (comments)      could not report allergic reaction    Benicar [Olmesartan] Unknown (comments)      could not report allergic reaction    Benzocaine Unknown (comments)      could not report allergic reaction    Captopril Unknown (comments)      could not report allergic reaction    Codeine Phosphate Unknown (comments)      could not report allergic reaction    Codeine Sulfate Unknown (comments)      could not report allergic reaction    Demerol [Meperidine] Unknown (comments)      could not report allergic reaction    Dilantin [Phenytoin Sodium Extended] Unknown (comments)      could not report allergic reaction    Diovan [Valsartan] Unknown (comments)      could not report allergic reaction    Doxycycline Unknown (comments)      could not report allergic reaction    Lortab [Hydrocodone-Acetaminophen] Unknown (comments)      could not report allergic reaction    Metformin Unknown (comments)      could not report allergic reaction    Norvasc [Amlodipine] Unknown (comments)      could not report allergic reaction    Parnate [Tranylcypromine] Unknown (comments)      could not report allergic reaction    Penicillins Unknown (comments)      could not report allergic reaction    Percocet [Oxycodone-Acetaminophen] Unknown (comments)      could not report allergic reaction    Pravastatin Other (comments)     Headaches    Robaxin [Methocarbamol] Other (comments)      could not report allergic reaction        SOCIAL HISTORY:     Smoking history:   None X   Former    Chronic      Alcohol history:   None X   Social Chronic      CODE STATUS:  DNR    Full X   Other      Objective:     Physical Exam:     Visit Vitals    /85    Pulse 84    Temp 97.3 °F (36.3 °C)    Resp 16    SpO2 98%    Breastfeeding No      O2 Device: Room air    General:  Alert, cooperative, no distress, appears stated age. Head:  Normocephalic,   HEENT:  Conjunctivae/corneas clear, EOMI       Lungs:   Clear to auscultation bilaterally. Heart:  Regular rate and rhythm, S1, S2 normal, no murmur   Abdomen:   Soft, non-tender. Bowel sounds normal. No masses   Extremities: Extremities normal, atraumatic, no edema. Skin: Skin color, texture, turgor normal. No rashes or lesions   Neurologic: CNII-XII intact. EKG: not done    Data Review:     24 Hour Results:  No results found for this or any previous visit (from the past 24 hour(s)). Imaging:     Assessment/Plan:     1. DM - resume glipizide, add sliding scale    2. HTN - controlled   - monitor  - c/w ASA    3. HLD - c/w Statin    4. Hypothyroidism - c/w synthroid     5. Likely Asymptomatic Bacteriuria, no UTI   - on Macrobid per primary team, would d/c abx due to no fever, or leukocytosis per labs on charts. -  requesting urology consult. Primary team to consider placing consult for [facility]. I do not think she has UTI at this time. 6. Dementia - c/w Namenda/Aricept    7.  Psychosis, c/w psych care        Signed By: Jessica Rose MD     July 7, 2017

## 2017-07-07 NOTE — PROGRESS NOTES
[address] [facility] on unit to see pt for H&P. Webb Barthel MD met with pt and pt's . Verbal order with read back given: order urology consult for today; if urology not able to see pt today pt to follow up with urology  out pt. Reason for consult: frequent UTI. Charge nurse aware. Order placed. Pt presents with increased irritability with visit from . Pt becomes restless, cursing, appears angry and confused. Redirection, education, therapeutic communication tolerated by pt. Pt refused meals today. Eating peanut butter crackers. Drinking water and diet ginger ale.

## 2017-07-07 NOTE — BH NOTES
PSYCHOSOCIAL ASSESSMENT  :Patient identifying info:  Georgina Vail is a 74 y.o., female admitted 2017  8:49 AM     Presenting problem and precipitating factors: Patient was sent to [facility] on a TDO due to inability to care for self and [facility]. Current psychiatric providers and contact info: no current provider     Previous psychiatric services/providers and response to treatment: She was hospitalized here at [facility] once for Dementia and unable to care for self       Substance abuse history:    Social History   Substance Use Topics    Smoking status: Never Smoker    Smokeless tobacco: Never Used    Alcohol use No       Family constellation:  - sister     Is significant other involved?        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

## 2017-07-07 NOTE — PROGRESS NOTES
Problem: Altered Thought Process (Adult/Pediatric) --  Goal to be met by0 7/14/2017  Goal: *STG: Participates in treatment plan  Outcome: Not Progressing Towards Goal  Variance: Patient Condition  Patient denies SI. When asked about HI patient states she would like to hurt someone but cannot say who and began to talk about clothing. Patient is confused and disoriented. Goal: *STG: Absence of lethality  Outcome: Progressing Towards Goal  Patient displays no aggressive behavior and has been appropriate on the unit. Problem: Falls - Risk of - Goal to be met by 07/14/2017  Goal: *Absence of falls  Outcome: Progressing Towards Goal  Patient remains free from falls. Will continue to monitor and assist as needed.        [address] [facility]  Master Treatment Plan for [place] Lisseth    Date Treatment Plan Initiated: 07/07/2017    Treatment Plan Modalities:  Type of Modality Amount  (x minutes) Frequency (x/week) Duration (x days) Name of Responsible Staff   Community & wrap-up meetings to encourage peer interactions 15 7 1 Windy Verma, RN    Group psychotherapy to assist in building coping skills and internal controls 60 7 1 Edson Gray LCSW   Therapeutic activity groups to build coping skills 60 7 1 Carolyn Turner LCSW   Psychoeducation in group setting to address:   Medication education   15 7 1 Annie Torres, RN    Coping skills         Relaxation techniques         Symptom management         Discharge planning   15 7 [facility]   Spirituality    60 132 [facility]         Recovery/AA/NA         Physician medication management   15 7 1 Dr. Chanell Weiss

## 2017-07-07 NOTE — BH NOTES
Admission reviewed for medical necessity. Will follow with care [facility].

## 2017-07-07 NOTE — BH NOTES
GROUP THERAPY PROGRESS NOTE    Enrico Lowe participated in a Afternoon Activity Group on the Geriatric Unit, with a focus on group singing and mood lifting. Group time: 60 minutes. Personal goal for participation: To increase the capacity to shift ones mood and share in group singing. Goal orientation: The patient will be able to enjoy music through either listening or singing. Group therapy participation: When prompted, this patient minimally participated in the group. Therapeutic interventions reviewed and discussed: The group members were asked to select songs from the unit songbook and either listen or sing along. Impression of participation: The patient began the group by standing but decided to sit and listen after the first five minutes. She may have been mouthing some of the words but she did not sing along with most of the songs. Her role and participation was largely passive.

## 2017-07-07 NOTE — BH NOTES
1555:  Urology Consult order called to [facility] at [phone]. Message left requesting consult today for frequent UTI. Advised that Dr. Esther Jean is the on-call physician for consults. Awaiting call back for confirmation. 1620:  Call received back conforming that Dr. Jermaine Hull will see patient today.

## 2017-07-07 NOTE — BH NOTES
GROUP THERAPY PROGRESS NOTE    Venda Dancer Ezella [facility] participated in a Morning Process Group on the Geriatric Unit, with a focus identifying feelings, planning for the day, and singing. Group time: 45 minutes. Personal goal for participation: To increase the capacity to shift ones mood, prepare for the day, and share in group singing. Goal orientation: The patient will be able to prepare for the day through group singing. Group therapy participation: When prompted, this patient minimally participated in the group. Therapeutic interventions reviewed and discussed: The group members were introduce themselves by first names and participate in group singing as a way to increase their oxygen and blood flow and begin their day on a positive note. They were also asked to join in singing several songs. Impression of participation: The patient sat in group but did not share with her peers. She shared her name, when asked, with the undersigned. It was difficult to know if she were experiencing any SI/HI or overt psychosis on the basis of her limited participation in group. She was mostly silent and stared at the floor. Her affect was depressed. Her mood was withdrawn and passive.

## 2017-07-08 LAB
GLUCOSE BLD STRIP.AUTO-MCNC: 104 MG/DL (ref 65–100)
GLUCOSE BLD STRIP.AUTO-MCNC: 142 MG/DL (ref 65–100)
SERVICE CMNT-IMP: ABNORMAL
SERVICE CMNT-IMP: ABNORMAL

## 2017-07-08 PROCEDURE — 74011636637 HC RX REV CODE- 636/637: Performed by: HOSPITALIST

## 2017-07-08 PROCEDURE — 74011250637 HC RX REV CODE- 250/637: Performed by: HOSPITALIST

## 2017-07-08 PROCEDURE — 74011250636 HC RX REV CODE- 250/636: Performed by: PSYCHIATRY & NEUROLOGY

## 2017-07-08 PROCEDURE — 74011250637 HC RX REV CODE- 250/637: Performed by: PSYCHIATRY & NEUROLOGY

## 2017-07-08 PROCEDURE — 65220000003 HC RM SEMIPRIVATE PSYCH

## 2017-07-08 PROCEDURE — 82962 GLUCOSE BLOOD TEST: CPT

## 2017-07-08 PROCEDURE — 74011000250 HC RX REV CODE- 250: Performed by: PSYCHIATRY & NEUROLOGY

## 2017-07-08 RX ADMIN — FERROUS SULFATE TAB 325 MG (65 MG ELEMENTAL FE) 325 MG: 325 (65 FE) TAB at 08:14

## 2017-07-08 RX ADMIN — OLANZAPINE 2.5 MG: 2.5 TABLET, FILM COATED ORAL at 16:39

## 2017-07-08 RX ADMIN — WATER 10 MG: 1 INJECTION INTRAMUSCULAR; INTRAVENOUS; SUBCUTANEOUS at 08:38

## 2017-07-08 RX ADMIN — MEMANTINE HYDROCHLORIDE 10 MG: 10 TABLET ORAL at 21:43

## 2017-07-08 RX ADMIN — ATORVASTATIN CALCIUM 40 MG: 40 TABLET, FILM COATED ORAL at 08:14

## 2017-07-08 RX ADMIN — NITROFURANTOIN MONOHYDRATE/MACROCRYSTALLINE 100 MG: 25; 75 CAPSULE ORAL at 08:14

## 2017-07-08 RX ADMIN — THERA TABS 1 TABLET: TAB at 08:14

## 2017-07-08 RX ADMIN — ZOLPIDEM TARTRATE 5 MG: 5 TABLET ORAL at 21:43

## 2017-07-08 RX ADMIN — DONEPEZIL HYDROCHLORIDE 10 MG: 10 TABLET, FILM COATED ORAL at 21:43

## 2017-07-08 RX ADMIN — MEMANTINE HYDROCHLORIDE 10 MG: 10 TABLET ORAL at 08:14

## 2017-07-08 RX ADMIN — GLIPIZIDE 5 MG: 5 TABLET ORAL at 08:14

## 2017-07-08 RX ADMIN — INSULIN LISPRO 2 UNITS: 100 INJECTION, SOLUTION INTRAVENOUS; SUBCUTANEOUS at 08:12

## 2017-07-08 RX ADMIN — CHLORPROMAZINE HYDROCHLORIDE 25 MG: 25 TABLET, SUGAR COATED ORAL at 08:14

## 2017-07-08 RX ADMIN — NITROFURANTOIN MONOHYDRATE/MACROCRYSTALLINE 100 MG: 25; 75 CAPSULE ORAL at 21:43

## 2017-07-08 RX ADMIN — LORAZEPAM 0.5 MG: 2 INJECTION INTRAMUSCULAR; INTRAVENOUS at 06:50

## 2017-07-08 RX ADMIN — ASPIRIN 81 MG 81 MG: 81 TABLET ORAL at 08:14

## 2017-07-08 RX ADMIN — CHLORPROMAZINE HYDROCHLORIDE 25 MG: 25 TABLET, SUGAR COATED ORAL at 18:00

## 2017-07-08 NOTE — BH NOTES
PRN Medication Documentation    Specific patient behavior that led to need for PRN medication: anxiety,irritable\"talking too herself,pacing\"  Staff interventions attempted prior to PRN being given: EMOTIONAL SUPPORT  PRN medication given: [id number]  Patient response/effectiveness of PRN medication: fair

## 2017-07-08 NOTE — BH NOTES
Out of bed around 8 am this morning with incontinent brief only on  Attempted to assist patient putting on gown, patient attempted to grab and pinch staff  Talking in world salads  Attempting to enter peers rooms  Geodon 10mg IM administered for severe agitation, physical aggression,  psychois at 0838, at 0930 pt lest restless although severely labile and unpredictable in her attempts to grab and pinch staff hands  disoriented times 4  Pt is not resting quietly in the dining room   Pt will not allow staff to assist in any ADLs and patient had no purposeful interactions

## 2017-07-08 NOTE — BH NOTES
0455  Pt got oob for brp; voided in toilet. While nurse went to get fresh pull-ups, pt wandered into 446 saying it was her room. Pt posturing, cussing, & threatening staff (scratched gloved hand of nurse). Escorted back into her room. Pt insisting on changing her own pull-up. Monitoring continues. 0602  Pt refusing AM labs. PRN Medication Documentation    Specific patient behavior that led to need for PRN medication: 0640  Pt standing in room by bed, having a BM. Pt got hostile & threatening when nurse offered to help clean it off floor & get her fresh pull-ups. Pt threatened to strike RN. Pt said \"That (BM) is OK, it's just Cheerios. \" Pt picking some of BM up off floor. Staff interventions attempted prior to PRN being given: Attempted reorienting pt, distraction, emotional support. PRN medication given: 0650  Ativan 0.5 mg IM given in pt's left upper arm--Pt tolerated well (2 other staff assisted holding pt & Security remained at doorway.)  Patient response/effectiveness of PRN medication: 0700 BM was removed from room. Pt calmer, but still cussing, wanting staff to leave her alone. Monitoring continues.

## 2017-07-08 NOTE — PROGRESS NOTES
Consult dictated [id number]    No urinary symptoms to suggest UTI from what I can tell although history of recent positive culture which was treated. Culture urine if symptoms develop but in the absence of symptoms positive culture more consistent with asymptomatic bacteruria and no treatment indicated. Recall PRN.

## 2017-07-08 NOTE — BH NOTES
1540:  Patient received as she is wandering the unit mumbling. She is soft-spoken and cooperative with staff at present. She is redirectable when escorted from another patient's room and voices no complaints or concerns at this time. Will maintain q 15 minute safety checks. 1745:  Patient assisted to the bathroom before dinner, but attempts made to obtain the ordered U/A were not successful. Patient was not able to fully cooperate with attempts to catch urine. Will continue to try to get the specimen. 1850:  Patient continues to be restless and disoriented. She is intrusive at times, touching and pushing other patients. She is very labile and pinches/scratches/punches when frustrated. Will continue to monitor.

## 2017-07-08 NOTE — BH NOTES
PSYCHIATRIC PROGRESS NOTE         Patient Name  Lakisha Pham   Date of Birth 1950   [facility] [id number]   Medical Record Number  [id number]      Age  74 y.o. PCP Domi Moon MD   Admit date:  7/7/2017    Room Number  748/01  @ [facility]   Date of Service  7/8/2017          PSYCHOTHERAPY SESSION NOTE:  Length of psychotherapy session: 45 minutes    Main condition/diagnosis/issues treated during session today, 7/8/2017 : self care, anxiety, coping skills    I employed Cognitive Behavioral therapy techniques, Reality-Oriented psychotherapy, as well as supportive psychotherapy in regards to various ongoing psychosocial stressors, including the following: pre-admission and current problems; medical issues; and stress of hospitalization. Interpersonal relationship issues and psychodynamic conflicts explored. Attempts made to alleviate maladaptive patterns. Overall, patient is not progressing    Treatment Plan Update (reviewed an updated 7/8/2017) : I will modify psychotherapy tx plan by implementing more stress management strategies, building upon cognitive behavioral techniques, increasing coping skills, as well as shoring up psychological defenses). An extended energy and skill set was needed to engage pt in psychotherapy due to some of the following: resistiveness, complexity, negativity, confrontational nature, hostile behaviors, and/or severe abnormalities in thought processes/psychosis resulting in the loss of expressive/receptive language communication skills. E & M PROGRESS NOTE:         HISTORY       CC:  \"aggression R/O UTI\"  HISTORY OF PRESENT ILLNESS/INTERVAL HISTORY:  (reviewed/updated 7/8/2017). per initial evaluation:     Lakisha Pham presents/reports/evidences the following emotional symptoms today, 7/8/2017:agitation. The above symptoms have been present for years. These symptoms are of severe severity.  The symptoms are constant  in nature. Additional symptomatology and features include agitation and anger outbursts. SIDE EFFECTS: (reviewed/updated 7/8/2017)  None reported or admitted to.   No noted toxicity with use of Depakote/Tegretol/lithium/Clozaril/TCAs   ALLERGIES:(reviewed/updated 7/8/2017)  Allergies   Allergen Reactions    Ancef [Cefazolin] Unknown (comments)      could not report allergic reaction    Antihistamine Decongestant [Triprolidine-Pseudoephedrine] Other (comments)      could not report allergic reaction    Antihistamine [Diphenhydramine Hcl] Other (comments)      could not report allergic reaction    Bactrim [Sulfamethoprim] Unknown (comments)      could not report allergic reaction    Benicar [Olmesartan] Unknown (comments)      could not report allergic reaction    Benzocaine Unknown (comments)      could not report allergic reaction    Captopril Unknown (comments)      could not report allergic reaction    Codeine Phosphate Unknown (comments)      could not report allergic reaction    Codeine Sulfate Unknown (comments)      could not report allergic reaction    Demerol [Meperidine] Unknown (comments)      could not report allergic reaction    Dilantin [Phenytoin Sodium Extended] Unknown (comments)      could not report allergic reaction    Diovan [Valsartan] Unknown (comments)      could not report allergic reaction    Doxycycline Unknown (comments)      could not report allergic reaction    Lortab [Hydrocodone-Acetaminophen] Unknown (comments)      could not report allergic reaction    Metformin Unknown (comments)      could not report allergic reaction    Norvasc [Amlodipine] Unknown (comments)      could not report allergic reaction    Parnate [Tranylcypromine] Unknown (comments)      could not report allergic reaction    Penicillins Unknown (comments)  could not report allergic reaction    Percocet [Oxycodone-Acetaminophen] Unknown (comments)      could not report allergic reaction    Pravastatin Other (comments)     Headaches    Robaxin [Methocarbamol] Other (comments)      could not report allergic reaction      MEDICATIONS PRIOR TO ADMISSION:(reviewed/updated 7/8/2017)  Prescriptions Prior to Admission   Medication Sig    glipiZIDE (GLUCOTROL) 5 mg tablet Take 5 mg by mouth daily. Indications: type 2 diabetes mellitus    multivitamin (ONE A DAY) tablet Take 1 Tab by mouth daily.  FERROUS SULFATE (IRON PO) Take 1 Tab by mouth daily.  aspirin 81 mg chewable tablet Take 81 mg by mouth daily. Indications: prevention of cerebrovascular accident    chlorproMAZINE (THORAZINE) 25 mg tablet Take 25 mg by mouth three (3) times daily.  memantine-donepezil (NAMZARIC) 28-10 mg CSpX Take 1 Cap by mouth daily. Indications: DEMENTIA    atorvastatin (LIPITOR) 40 mg tablet Take 1 Tab by mouth daily. Indications: PREVENTION OF CEREBROVASCULAR ACCIDENT      PAST MEDICAL HISTORY: Past medical history from the initial psychiatric evaluation has been reviewed (reviewed/updated 7/8/2017) with no additional updates (I asked patient and no additional past medical history provided). Past Medical History:   Diagnosis Date    Chronic kidney disease     stones in past    Diabetes ([facility] [address]     Hypertension     Psychiatric disorder     depression     Past Surgical History:   Procedure Laterality Date    HX OTHER SURGICAL      hysterectomy    HX OTHER SURGICAL      gallbladder    HX OTHER SURGICAL      elbows - bilateral screw placement      SOCIAL HISTORY: Social history from the initial psychiatric evaluation has been reviewed (reviewed/updated 7/8/2017) with no additional updates (I asked patient and no additional social history provided).  Social History     Social History    Marital status:      Spouse name: N/A    Number of children: N/A  Years of education: N/A     Occupational History    Not on file. Social History Main Topics    Smoking status: Never Smoker    Smokeless tobacco: Never Used    Alcohol use No    Drug use: No    Sexual activity: Yes     Partners: Male     Other Topics Concern    Not on file     Social History Narrative    [id number] IMPAIRMENT IN THE CONTEXT OF WORSENING CEREBROVASCULAR DEMENTIA. Patient has had acute mental status change in addition, most likely due to UTI. During past [facility] admissions, 's family, Nursing Director, Social Work, Patient Advocate and her attending psychiatirst ALL concurred that patient needed placement in a SNF. Patient's  was against this recommendation because he believes the patient only needs psychotherapy, and doies not need medications. FAMILY HISTORY: Family history from the initial psychiatric evaluation has been reviewed (reviewed/updated 7/8/2017) with no additional updates (I asked patient and no additional family history provided). Family History   Problem Relation Age of Onset    Diabetes Mother     Diabetes Father        REVIEW OF SYSTEMS: (reviewed/updated 7/8/2017)  Appetite:no change from normal   Sleep: no change   All other Review of Systems: Psychological ROS: positive for - anxiety  Cardiovascular ROS: no chest pain or dyspnea on exertion  Gastrointestinal ROS: no abdominal pain, change in bowel habits, or black or bloody stools         [facility] (MSE):    MSE FINDINGS ARE WITHIN NORMAL LIMITS (WNL) UNLESS OTHERWISE STATED BELOW. ( ALL OF THE BELOW CATEGORIES OF THE MSE HAVE BEEN REVIEWED (reviewed 7/8/2017) AND UPDATED AS DEEMED APPROPRIATE )  General Presentation older than stated age, uncooperative   Orientation oriented to time, place and person   Vital Signs  See below (reviewed 7/8/2017);  Vital Signs (BP, Pulse, & Temp) are within normal limits if not listed below. Gait and Station Stable/steady, no ataxia   Musculoskeletal System No extrapyramidal symptoms (EPS); no abnormal muscular movements or Tardive Dyskinesia (TD); muscle strength and tone are within normal limits   Language No aphasia or dysarthria   Speech:  hypoverbal   Thought Processes concrete; slow rate of thoughts; poor abstract reasoning/computation   Thought Associations goal directed   Thought Content free of delusions   Suicidal Ideations none   Homicidal Ideations none   Mood:  angry, hostile  and irritable   Affect:  mood-congruent   Memory recent  poor   Memory remote:  impaired   Concentration/Attention:  inattentive   Fund of Knowledge below average   Insight:  poor   Reliability poor   Judgment:  limited          VITALS:     Patient Vitals for the past 24 hrs:   Temp Pulse Resp BP SpO2   07/08/17 1300 - - 18 - -   07/08/17 0758 98.6 °F (37 °C) 91 18 141/80 -   07/07/17 1600 97.9 °F (36.6 °C) 87 16 (!) 152/91 98 %     Wt Readings from Last 3 Encounters:   11/13/16 68.8 kg (151 lb 9.6 oz)     Temp Readings from Last 3 Encounters:   07/08/17 98.6 °F (37 °C)   11/18/16 98.1 °F (36.7 °C)   10/30/15 98.4 °F (36.9 °C)     BP Readings from Last 3 Encounters:   07/08/17 141/80   11/18/16 123/81   10/30/15 120/75     Pulse Readings from Last 3 Encounters:   07/08/17 91   11/18/16 92   10/30/15 99            DATA     LABORATORY DATA:(reviewed/updated 7/8/2017)  Recent Results (from the past 24 hour(s))   GLUCOSE, POC    Collection Time: 07/07/17  4:33 PM   Result Value Ref Range    Glucose (POC) 141 (H) 65 - 100 mg/dL    Performed by [employer]    GLUCOSE, POC    Collection Time: 07/08/17  7:41 AM   Result Value Ref Range    Glucose (POC) 142 (H) 65 - 100 mg/dL    Performed by Emma Recinos      No results found for: VALF2, VALAC, VALP, VALPR, DS6, CRBAM, CRBAMP, CARB2, XCRBAM  No results found for: LITHM   RADIOLOGY REPORTS:(reviewed/updated 7/8/2017)  No results found. MEDICATIONS     ALL MEDICATIONS:   Current Facility-Administered Medications   Medication Dose Route Frequency    nitrofurantoin (macrocrystal-monohydrate) (MACROBID) capsule 100 mg  100 mg Oral BID    OLANZapine (ZyPREXA) tablet 2.5 mg  2.5 mg Oral Q6H PRN    ziprasidone (GEODON) 10 mg in sterile water (preservative free) 0.5 mL injection  10 mg IntraMUSCular BID PRN    benztropine (COGENTIN) tablet 1 mg  1 mg Oral BID PRN    benztropine (COGENTIN) injection 1 mg  1 mg IntraMUSCular BID PRN    LORazepam (ATIVAN) injection 0.5 mg  0.5 mg IntraMUSCular Q4H PRN    LORazepam (ATIVAN) tablet 0.5 mg  0.5 mg Oral Q4H PRN    zolpidem (AMBIEN) tablet 5 mg  5 mg Oral QHS PRN    ibuprofen (MOTRIN) tablet 400 mg  400 mg Oral Q8H PRN    magnesium hydroxide (MILK OF MAGNESIA) 400 mg/5 mL oral suspension 30 mL  30 mL Oral DAILY PRN    nicotine (NICODERM CQ) 21 mg/24 hr patch 1 Patch  1 Patch TransDERmal DAILY PRN    atorvastatin (LIPITOR) tablet 40 mg  40 mg Oral DAILY    aspirin chewable tablet 81 mg  81 mg Oral DAILY    therapeutic multivitamin (THERAGRAN) tablet 1 Tab  1 Tab Oral DAILY    ferrous sulfate tablet 325 mg  325 mg Oral DAILY WITH BREAKFAST    insulin lispro (HUMALOG) injection   SubCUTAneous AC&HS    glucose chewable tablet 16 g  4 Tab Oral PRN    glucagon (GLUCAGEN) injection 1 mg  1 mg IntraMUSCular PRN    glipiZIDE (GLUCOTROL) tablet 5 mg  5 mg Oral DAILY    donepezil (ARICEPT) tablet 10 mg  10 mg Oral QHS    memantine (NAMENDA) tablet 10 mg  10 mg Oral BID    dextrose 10 % infusion 125-250 mL  125-250 mL IntraVENous PRN    chlorproMAZINE (THORAZINE) tablet 25 mg  25 mg Oral BID      SCHEDULED MEDICATIONS:   Current Facility-Administered Medications   Medication Dose Route Frequency    nitrofurantoin (macrocrystal-monohydrate) (MACROBID) capsule 100 mg  100 mg Oral BID    atorvastatin (LIPITOR) tablet 40 mg  40 mg Oral DAILY    aspirin chewable tablet 81 mg  81 mg Oral DAILY    therapeutic multivitamin (THERAGRAN) tablet 1 Tab  1 Tab Oral DAILY    ferrous sulfate tablet 325 mg  325 mg Oral DAILY WITH BREAKFAST    insulin lispro (HUMALOG) injection   SubCUTAneous AC&HS    glipiZIDE (GLUCOTROL) tablet 5 mg  5 mg Oral DAILY    donepezil (ARICEPT) tablet 10 mg  10 mg Oral QHS    memantine (NAMENDA) tablet 10 mg  10 mg Oral BID    chlorproMAZINE (THORAZINE) tablet 25 mg  25 mg Oral BID          ASSESSMENT & PLAN     DIAGNOSES REQUIRING ACTIVE TREATMENT AND MONITORING: (reviewed/updated 7/8/2017)  Patient Active Hospital Problem List:   Dementia (10/19/2016)    Assessment:cognitive, logical, language decline    Plan: continue current care   Schizoaffective disorder ([facility] [address] (7/7/2017)    Assessment: nila/depression alternating with psychosis    Plan: continue current care   UTI (urinary tract infection) (7/7/2017)    Assessment:bacteria and leukocyte esterase positive urine    Plan: repeat ua      PATIENT'S SPOUSE IS HER POWER OF  AND HAS STATED HE WANTS NO MEDICATION CHANGES OTHER THAN WHAT THE PATIENT IS CURRENTLY TAKING. In summary, Lolis Villeda, is a 74 y.o.  female who presents with a severe exacerbation of the principal diagnosis of Dementia  Patient's condition is worsening/not improving/not stable . Patient requires continued inpatient hospitalization for further stabilization, safety monitoring and medication management. I will continue to coordinate the provision of individual, milieu, occupational, group, and substance abuse therapies to address target symptoms/diagnoses as deemed appropriate for the individual patient. A coordinated, multidisplinary treatment team round was conducted with the patient (this team consists of the nurse, psychiatric unit pharmcist,  and writer).      Complete current electronic health record for patient has been reviewed today including consultant notes, ancillary staff notes, nurses and psychiatric tech notes.    Suicide risk assessment completed and patient deemed to be of low risk for suicide at this time. The following regarding medications was addressed during rounds with patient:   the risks and benefits of the proposed medication. The patient was given the opportunity to ask questions. Informed consent given to the use of the above medications. Will continue to adjust psychiatric and non-psychiatric medications (see above \"medication\" section and orders section for details) as deemed appropriate & based upon diagnoses and response to treatment. I will continue to order blood tests/labs and diagnostic tests as deemed appropriate and review results as they become available (see orders for details and above listed lab/test results). I will order psychiatric records from previous [facility] hospitals to further elucidate the nature of patient's psychopathology and review once available. I will gather additional collateral information from friends, family and o/p treatment team to further elucidate the nature of patient's psychopathology and baselline level of psychiatric functioning. I certify that this patient's inpatient psychiatric hospital services furnished since the previous certification were, and continue to be, required for treatment that could reasonably be expected to improve the patient's condition, or for diagnostic study, and that the patient continues to need, on a daily basis, active treatment furnished directly by or requiring the supervision of inpatient psychiatric facility personnel. In addition, the hospital records show that services furnished were intensive treatment services, admission or related services, or equivalent services.     EXPECTED DISCHARGE DATE/DAY: TBD     DISPOSITION: Home       Signed By:   Hunter Dubois MD  7/8/2017

## 2017-07-08 NOTE — BH NOTES
Behavioral Health Interdisciplinary Rounds     Patient Name: Melchor Lewis  Age: 74 y.o. Room/Bed:  748/01  Primary Diagnosis: Dementia   Admission Status: Involuntary Commitment     Readmission within 30 days: yes--transferred from another [place] [id number] in place: yes-- to bring papers  Patient requires a blocked bed: yes          Reason for blocked bed: Contact Precautions+aggressive behaviors towards men    VTE Prophylaxis: Yes--ASA  Mobility needs/Fall risk: yes    Nutritional Plan: no  Consults: Urologist saw--he said if UTI symptoms reoccur, call him back, otherwise no treatment indicated         Labs/Testing due today?: yes    Sleep hours:  6.5+       Participation in Care/Groups:  no  Medication Compliant?: Refusing Psychiatric Medications and Refusing Medical Medications  PRNS (last 24 hours): Antianxiety (IM Ativan)    Restraints (last 24 hours):  no  Substance Abuse:  no  CIWA (range last 24 hours): na COWS (range last 24 hours): na  Alcohol screening (AUDIT) completed -  AUDIT Score: 0  If applicable, date SBIRT discussed in treatment team AND documented: na  Tobacco - patient is a smoker: no   Date tobacco education completed by RN: john  24 hour chart check complete: yes     Patient goal(s) for today:   Treatment team focus/goals: Attempt to get urine sample  LOS:  1  Expected LOS: TBD  Psychiatric Advanced Care Directives -  No  Name of Decision maker if patient has Psychiatric Care Directive: None   Patient was offered information, patient declined.    Financial concerns/prescription coverage: [facility] Medicare  Date of last family contact: 7/7/17      Family requesting physician contact today:  Yes-- wants to talk with doctor ASAP  Discharge plan: Return home with  when ready for discharge      Outpatient provider(s): Dr. Ramona Jones (PCP)    Participating treatment team members: Annie Joshi MSW; Dr. Avtar Lane MD; Iza Dean Ellie Crespo

## 2017-07-08 NOTE — CONSULTS
[address]   [address] [facility], [facility]   [address] [facility]       Name:  David Davis   MR#:  [id number]   :  1950   Account #:  [de-identified]    Date of Consultation:  2017   Date of Adm:  2017       REASON FOR CONSULTATION: Recurrent urinary tract infection. HISTORY:  The patient is a 58-year-old female with a history of   dementia and psychosis. She is current admitted to the psychiatric   floor at [facility] and recent was at [facility]  from what I   understand. Apparently, there has been history of sporadic urinary tract   infections. I was told by the nursing staff that a urine culture at [facility]   grew E. Coli, but the details of this are very unclear. The patient is a   very poor historian and is clearly confused, however, reports no pain   nor any urinary symptoms. Apparently, the patient's    requested urology consultation for recurrent infections. I do not see   any recent urinalyses with the current admission. There is a urine   culture from 2006, which grew E. Coli, and from what I am   told, a recent culture grew E. Coli. PAST MEDICAL HISTORY:  Psychiatric disorder, hypertension, and   diabetes. PAST SURGICAL HISTORY:  Hysterectomy, cholecystectomy, as well   as orthopedic procedures. CURRENT MEDICATIONS:  Reviewed and listed in the [facility] ALLERGIES:     1. ANCEF    2. ANTIHISTAMINES. REVIEW OF SYSTEMS:  A 12-point review of systems is unable to be   reliably obtained. PHYSICAL EXAMINATION:     VITAL SIGNS:  She is afebrile, blood pressure 152/91, heart rate is 87. GENERAL:  She is confused and somewhat agitated. She denies any   pain or voiding symptoms. She has no apparent CVA tenderness. ABDOMEN:  Soft and benign. LABORATORY DATA:  No recent labs are available with the current   admission. Creatinine is 0.99 in November. No relevant imaging is in   the [facility] system. ASSESSMENT AND PLAN: This is a 64-year-old female who is not a   reliable historian. Apparently, there have been sporadic urinary tract   infections, although the details are unclear. At the present time, there   is no suggestion of symptomatic infection. In the absence of any   symptoms, I would not recommend any further studies. Certainly, if   she were to have any voiding symptoms, such as burning or any other   complaints, I would recommend urine culture and then that could be   treated accordingly. Please call if things change or if there are any   questions.           Sabrina Morris MD DPM / MONY   D:  07/08/2017   00:53   T:  07/08/2017   14:05   Job #:  [id number]

## 2017-07-08 NOTE — PROGRESS NOTES
Problem: Altered Thought Process (Adult/Pediatric)  Goal: *STG: Remains safe in hospital  Outcome: Progressing Towards Goal  Patient remains safe in hospital. Flat affect, non-cooperative, altered mental status, confused, irritable, paranoid. Patient does not take re-direction well. Patient is med compliant, ate 25% of breakfast, refused lunch, currently asleep in chair in dining room. Remains on Q15 min safety checks.

## 2017-07-08 NOTE — PROGRESS NOTES
Problem: Altered Thought Process (Adult/Pediatric)  Goal: *STG: Absence of lethality  Outcome: Not Progressing Towards Goal  Patient has been verbally and physically aggressive with staff, other patients and a consulting MD this evening. She is difficult to redirect and has refused her scheduled medications.

## 2017-07-08 NOTE — PROGRESS NOTES
Problem: Aggression and Hostility (Behavioral Health)  Goal: *Express anger or hostility appropriately (without verbal or physical aggression)  Outcome: Not Progressing Towards Goal  Pt cursing and

## 2017-07-09 LAB
GLUCOSE BLD STRIP.AUTO-MCNC: 148 MG/DL (ref 65–100)
GLUCOSE BLD STRIP.AUTO-MCNC: 150 MG/DL (ref 65–100)
GLUCOSE BLD STRIP.AUTO-MCNC: 158 MG/DL (ref 65–100)
SERVICE CMNT-IMP: ABNORMAL

## 2017-07-09 PROCEDURE — 74011250637 HC RX REV CODE- 250/637: Performed by: HOSPITALIST

## 2017-07-09 PROCEDURE — 74011250637 HC RX REV CODE- 250/637: Performed by: PSYCHIATRY & NEUROLOGY

## 2017-07-09 PROCEDURE — 82962 GLUCOSE BLOOD TEST: CPT

## 2017-07-09 PROCEDURE — 65220000003 HC RM SEMIPRIVATE PSYCH

## 2017-07-09 RX ADMIN — MEMANTINE HYDROCHLORIDE 10 MG: 10 TABLET ORAL at 08:07

## 2017-07-09 RX ADMIN — CHLORPROMAZINE HYDROCHLORIDE 25 MG: 25 TABLET, SUGAR COATED ORAL at 08:06

## 2017-07-09 RX ADMIN — ZOLPIDEM TARTRATE 5 MG: 5 TABLET ORAL at 21:46

## 2017-07-09 RX ADMIN — OLANZAPINE 2.5 MG: 2.5 TABLET, FILM COATED ORAL at 16:48

## 2017-07-09 RX ADMIN — CHLORPROMAZINE HYDROCHLORIDE 25 MG: 25 TABLET, SUGAR COATED ORAL at 18:00

## 2017-07-09 RX ADMIN — THERA TABS 1 TABLET: TAB at 08:06

## 2017-07-09 RX ADMIN — FERROUS SULFATE TAB 325 MG (65 MG ELEMENTAL FE) 325 MG: 325 (65 FE) TAB at 08:07

## 2017-07-09 RX ADMIN — GLIPIZIDE 5 MG: 5 TABLET ORAL at 08:14

## 2017-07-09 RX ADMIN — NITROFURANTOIN MONOHYDRATE/MACROCRYSTALLINE 100 MG: 25; 75 CAPSULE ORAL at 21:02

## 2017-07-09 RX ADMIN — ATORVASTATIN CALCIUM 40 MG: 40 TABLET, FILM COATED ORAL at 08:06

## 2017-07-09 RX ADMIN — ASPIRIN 81 MG 81 MG: 81 TABLET ORAL at 08:07

## 2017-07-09 RX ADMIN — MEMANTINE HYDROCHLORIDE 10 MG: 10 TABLET ORAL at 21:02

## 2017-07-09 RX ADMIN — NITROFURANTOIN MONOHYDRATE/MACROCRYSTALLINE 100 MG: 25; 75 CAPSULE ORAL at 08:06

## 2017-07-09 RX ADMIN — DONEPEZIL HYDROCHLORIDE 10 MG: 10 TABLET, FILM COATED ORAL at 21:02

## 2017-07-09 NOTE — BH NOTES
PSYCHIATRIC PROGRESS NOTE         Patient Name  Hina Mchugh   Date of Birth 1950   [facility] [id number]   Medical Record Number  [id number]      Age  74 y.o. PCP Kate Pulido MD   Admit date:  7/7/2017    Room Number  748/01  @ [facility]   Date of Service  7/9/2017          PSYCHOTHERAPY SESSION NOTE:  Length of psychotherapy session: 45 minutes    Main condition/diagnosis/issues treated during session today, 7/9/2017 : self care, anxiety, coping skills    I employed Cognitive Behavioral therapy techniques, Reality-Oriented psychotherapy, as well as supportive psychotherapy in regards to various ongoing psychosocial stressors, including the following: pre-admission and current problems; medical issues; and stress of hospitalization. Interpersonal relationship issues and psychodynamic conflicts explored. Attempts made to alleviate maladaptive patterns. Overall, patient is not progressing    Treatment Plan Update (reviewed an updated 7/9/2017) : I will modify psychotherapy tx plan by implementing more stress management strategies, building upon cognitive behavioral techniques, increasing coping skills, as well as shoring up psychological defenses). An extended energy and skill set was needed to engage pt in psychotherapy due to some of the following: resistiveness, complexity, negativity, confrontational nature, hostile behaviors, and/or severe abnormalities in thought processes/psychosis resulting in the loss of expressive/receptive language communication skills. E & M PROGRESS NOTE:         HISTORY       CC:  \"aggression R/O UTI\"  HISTORY OF PRESENT ILLNESS/INTERVAL HISTORY:  (reviewed/updated 7/9/2017). per initial evaluation:     Hina Mchugh presents/reports/evidences the following emotional symptoms today, 7/9/2017:agitation. The above symptoms have been present for years. These symptoms are of severe severity.  The symptoms are constant  in nature. Additional symptomatology and features include agitation and anger outbursts. 7/9/17- Very aggressive and oppositional. Is deliberately not urinating in hat to test urine sample. Poor insight. SIDE EFFECTS: (reviewed/updated 7/9/2017)  None reported or admitted to.   No noted toxicity with use of Depakote/Tegretol/lithium/Clozaril/TCAs   ALLERGIES:(reviewed/updated 7/9/2017)  Allergies   Allergen Reactions    Ancef [Cefazolin] Unknown (comments)      could not report allergic reaction    Antihistamine Decongestant [Triprolidine-Pseudoephedrine] Other (comments)      could not report allergic reaction    Antihistamine [Diphenhydramine Hcl] Other (comments)      could not report allergic reaction    Bactrim [Sulfamethoprim] Unknown (comments)      could not report allergic reaction    Benicar [Olmesartan] Unknown (comments)      could not report allergic reaction    Benzocaine Unknown (comments)      could not report allergic reaction    Captopril Unknown (comments)      could not report allergic reaction    Codeine Phosphate Unknown (comments)      could not report allergic reaction    Codeine Sulfate Unknown (comments)      could not report allergic reaction    Demerol [Meperidine] Unknown (comments)      could not report allergic reaction    Dilantin [Phenytoin Sodium Extended] Unknown (comments)      could not report allergic reaction    Diovan [Valsartan] Unknown (comments)      could not report allergic reaction    Doxycycline Unknown (comments)      could not report allergic reaction    Lortab [Hydrocodone-Acetaminophen] Unknown (comments)      could not report allergic reaction    Metformin Unknown (comments)      could not report allergic reaction    Norvasc [Amlodipine] Unknown (comments)      could not report allergic reaction    Parnate [Tranylcypromine] Unknown (comments)      could not report allergic reaction    Penicillins Unknown (comments)      could not report allergic reaction    Percocet [Oxycodone-Acetaminophen] Unknown (comments)      could not report allergic reaction    Pravastatin Other (comments)     Headaches    Robaxin [Methocarbamol] Other (comments)      could not report allergic reaction      MEDICATIONS PRIOR TO ADMISSION:(reviewed/updated 7/9/2017)  Prescriptions Prior to Admission   Medication Sig    glipiZIDE (GLUCOTROL) 5 mg tablet Take 5 mg by mouth daily. Indications: type 2 diabetes mellitus    multivitamin (ONE A DAY) tablet Take 1 Tab by mouth daily.  FERROUS SULFATE (IRON PO) Take 1 Tab by mouth daily.  aspirin 81 mg chewable tablet Take 81 mg by mouth daily. Indications: prevention of cerebrovascular accident    chlorproMAZINE (THORAZINE) 25 mg tablet Take 25 mg by mouth three (3) times daily.  memantine-donepezil (NAMZARIC) 28-10 mg CSpX Take 1 Cap by mouth daily. Indications: DEMENTIA    atorvastatin (LIPITOR) 40 mg tablet Take 1 Tab by mouth daily. Indications: PREVENTION OF CEREBROVASCULAR ACCIDENT      PAST MEDICAL HISTORY: Past medical history from the initial psychiatric evaluation has been reviewed (reviewed/updated 7/9/2017) with no additional updates (I asked patient and no additional past medical history provided).    Past Medical History:   Diagnosis Date    Chronic kidney disease     stones in past    Diabetes ([facility] [address]     Hypertension     Psychiatric disorder     depression     Past Surgical History:   Procedure Laterality Date    HX OTHER SURGICAL      hysterectomy    HX OTHER SURGICAL      gallbladder    HX OTHER SURGICAL      elbows - bilateral screw placement      SOCIAL HISTORY: Social history from the initial psychiatric evaluation has been reviewed (reviewed/updated 7/9/2017) with no additional updates (I asked patient and no additional social history provided). Social History     Social History    Marital status:      Spouse name: N/A    Number of children: N/A    Years of education: N/A     Occupational History    Not on file. Social History Main Topics    Smoking status: Never Smoker    Smokeless tobacco: Never Used    Alcohol use No    Drug use: No    Sexual activity: Yes     Partners: Male     Other Topics Concern    Not on file     Social History Narrative    [id number] IMP[facility] IN THE CONTEXT OF WORSENING CEREBROVASCULAR DEMENTIA. Patient has had acute mental status change in addition, most likely due to UTI. During past [facility] admissions, 's family, Nursing Director, Social Work, Patient Advocate and her attending psychiatirst ALL concurred that patient needed placement in a SNF. Patient's  was against this recommendation because he believes the patient only needs psychotherapy, and doies not need medications. FAMILY HISTORY: Family history from the initial psychiatric evaluation has been reviewed (reviewed/updated 7/9/2017) with no additional updates (I asked patient and no additional family history provided).    Family History   Problem Relation Age of Onset    Diabetes Mother     Diabetes Father        REVIEW OF SYSTEMS: (reviewed/updated 7/9/2017)  Appetite:no change from normal   Sleep: no change   All other Review of Systems: Psychological ROS: positive for - anxiety  Cardiovascular ROS: no chest pain or dyspnea on exertion  Gastrointestinal ROS: no abdominal pain, change in bowel habits, or black or bloody stools         [facility] ([facility]    MSE FINDINGS ARE WITHIN NORMAL LIMITS (WNL) UNLESS OTHERWISE STATED BELOW. ( ALL OF THE BELOW CATEGORIES OF THE MSE HAVE BEEN REVIEWED (reviewed 7/9/2017) AND UPDATED AS DEEMED APPROPRIATE )  General Presentation older than stated age, uncooperative Orientation oriented to time, place and person   Vital Signs  See below (reviewed 7/9/2017); Vital Signs (BP, Pulse, & Temp) are within normal limits if not listed below.    Gait and Station Stable/steady, no ataxia   Musculoskeletal System No extrapyramidal symptoms (EPS); no abnormal muscular movements or Tardive Dyskinesia (TD); muscle strength and tone are within normal limits   Language No aphasia or dysarthria   Speech:  hypoverbal   Thought Processes concrete; slow rate of thoughts; poor abstract reasoning/computation   Thought Associations goal directed   Thought Content free of delusions   Suicidal Ideations none   Homicidal Ideations none   Mood:  angry, hostile  and irritable   Affect:  mood-congruent   Memory recent  poor   Memory remote:  impaired   Concentration/Attention:  inattentive   Fund of Knowledge below average   Insight:  poor   Reliability poor   Judgment:  limited          VITALS:     Patient Vitals for the past 24 hrs:   Temp Pulse Resp BP   07/09/17 0932 97.7 °F (36.5 °C) 96 18 134/85     Wt Readings from Last 3 Encounters:   07/09/17 70 kg (154 lb 6.4 oz)   11/13/16 68.8 kg (151 lb 9.6 oz)     Temp Readings from Last 3 Encounters:   07/09/17 97.7 °F (36.5 °C)   11/18/16 98.1 °F (36.7 °C)   10/30/15 98.4 °F (36.9 °C)     BP Readings from Last 3 Encounters:   07/09/17 134/85   11/18/16 123/81   10/30/15 120/75     Pulse Readings from Last 3 Encounters:   07/09/17 96   11/18/16 92   10/30/15 99            DATA     LABORATORY DATA:(reviewed/updated 7/9/2017)  Recent Results (from the past 24 hour(s))   GLUCOSE, POC    Collection Time: 07/08/17  3:50 PM   Result Value Ref Range    Glucose (POC) 104 (H) 65 - 100 mg/dL    Performed by [facility] [address] [place] [address], POC    Collection Time: 07/09/17  8:13 AM   Result Value Ref Range    Glucose (POC) 148 (H) 65 - 100 mg/dL    Performed by Neftaly Alfaro    GLUCOSE, POC    Collection Time: 07/09/17 12:03 PM   Result Value Ref Range    Glucose (POC) 158 (H) 65 - 100 mg/dL    Performed by Shelley Tarango      No results found for: VALF2, VALAC, VALP, VALPR, DS6, CRBAM, CRBAMP, CARB2, XCRBAM  No results found for: LITHM   RADIOLOGY REPORTS:(reviewed/updated 7/9/2017)  No results found.        MEDICATIONS     ALL MEDICATIONS:   Current Facility-Administered Medications   Medication Dose Route Frequency    nitrofurantoin (macrocrystal-monohydrate) (MACROBID) capsule 100 mg  100 mg Oral BID    OLANZapine (ZyPREXA) tablet 2.5 mg  2.5 mg Oral Q6H PRN    ziprasidone (GEODON) 10 mg in sterile water (preservative free) 0.5 mL injection  10 mg IntraMUSCular BID PRN    benztropine (COGENTIN) tablet 1 mg  1 mg Oral BID PRN    benztropine (COGENTIN) injection 1 mg  1 mg IntraMUSCular BID PRN    LORazepam (ATIVAN) injection 0.5 mg  0.5 mg IntraMUSCular Q4H PRN    LORazepam (ATIVAN) tablet 0.5 mg  0.5 mg Oral Q4H PRN    zolpidem (AMBIEN) tablet 5 mg  5 mg Oral QHS PRN    ibuprofen (MOTRIN) tablet 400 mg  400 mg Oral Q8H PRN    magnesium hydroxide (MILK OF MAGNESIA) 400 mg/5 mL oral suspension 30 mL  30 mL Oral DAILY PRN    nicotine (NICODERM CQ) 21 mg/24 hr patch 1 Patch  1 Patch TransDERmal DAILY PRN    atorvastatin (LIPITOR) tablet 40 mg  40 mg Oral DAILY    aspirin chewable tablet 81 mg  81 mg Oral DAILY    therapeutic multivitamin (THERAGRAN) tablet 1 Tab  1 Tab Oral DAILY    ferrous sulfate tablet 325 mg  325 mg Oral DAILY WITH BREAKFAST    insulin lispro (HUMALOG) injection   SubCUTAneous AC&HS    glucose chewable tablet 16 g  4 Tab Oral PRN    glucagon (GLUCAGEN) injection 1 mg  1 mg IntraMUSCular PRN    glipiZIDE (GLUCOTROL) tablet 5 mg  5 mg Oral DAILY    donepezil (ARICEPT) tablet 10 mg  10 mg Oral QHS    memantine (NAMENDA) tablet 10 mg  10 mg Oral BID    dextrose 10 % infusion 125-250 mL  125-250 mL IntraVENous PRN    chlorproMAZINE (THORAZINE) tablet 25 mg  25 mg Oral BID      SCHEDULED MEDICATIONS:   Current Facility-Administered Medications Medication Dose Route Frequency    nitrofurantoin (macrocrystal-monohydrate) (MACROBID) capsule 100 mg  100 mg Oral BID    atorvastatin (LIPITOR) tablet 40 mg  40 mg Oral DAILY    aspirin chewable tablet 81 mg  81 mg Oral DAILY    therapeutic multivitamin (THERAGRAN) tablet 1 Tab  1 Tab Oral DAILY    ferrous sulfate tablet 325 mg  325 mg Oral DAILY WITH BREAKFAST    insulin lispro (HUMALOG) injection   SubCUTAneous AC&HS    glipiZIDE (GLUCOTROL) tablet 5 mg  5 mg Oral DAILY    donepezil (ARICEPT) tablet 10 mg  10 mg Oral QHS    memantine (NAMENDA) tablet 10 mg  10 mg Oral BID    chlorproMAZINE (THORAZINE) tablet 25 mg  25 mg Oral BID          ASSESSMENT & PLAN     DIAGNOSES REQUIRING ACTIVE TREATMENT AND MONITORING: (reviewed/updated 7/9/2017)  Patient Active Hospital Problem List:   Dementia (10/19/2016)    Assessment:cognitive, logical, language decline    Plan: continue current care   Schizoaffective disorder ([facility] [address] (7/7/2017)    Assessment: nila/depression alternating with psychosis    Plan: continue current care   UTI (urinary tract infection) (7/7/2017)    Assessment:bacteria and leukocyte esterase positive urine    Plan: repeat ua      PATIENT'S SPOUSE IS HER POWER OF  AND HAS STATED HE WANTS NO MEDICATION CHANGES OTHER THAN WHAT THE PATIENT IS CURRENTLY TAKING. In summary, Breezy Azul, is a 74 y.o.  female who presents with a severe exacerbation of the principal diagnosis of Dementia  Patient's condition is worsening/not improving/not stable . Patient requires continued inpatient hospitalization for further stabilization, safety monitoring and medication management. I will continue to coordinate the provision of individual, milieu, occupational, group, and substance abuse therapies to address target symptoms/diagnoses as deemed appropriate for the individual patient.   A coordinated, multidisplinary treatment team round was conducted with the patient (this team consists of the nurse, psychiatric unit pharmcist,  and writer). Complete current electronic health record for patient has been reviewed today including consultant notes, ancillary staff notes, nurses and psychiatric tech notes. Suicide risk assessment completed and patient deemed to be of low risk for suicide at this time. The following regarding medications was addressed during rounds with patient:   the risks and benefits of the proposed medication. The patient was given the opportunity to ask questions. Informed consent given to the use of the above medications. Will continue to adjust psychiatric and non-psychiatric medications (see above \"medication\" section and orders section for details) as deemed appropriate & based upon diagnoses and response to treatment. I will continue to order blood tests/labs and diagnostic tests as deemed appropriate and review results as they become available (see orders for details and above listed lab/test results). I will order psychiatric records from previous [facility] hospitals to further elucidate the nature of patient's psychopathology and review once available. I will gather additional collateral information from friends, family and o/p treatment team to further elucidate the nature of patient's psychopathology and baselline level of psychiatric functioning. I certify that this patient's inpatient psychiatric hospital services furnished since the previous certification were, and continue to be, required for treatment that could reasonably be expected to improve the patient's condition, or for diagnostic study, and that the patient continues to need, on a daily basis, active treatment furnished directly by or requiring the supervision of inpatient psychiatric facility personnel.  In addition, the hospital records show that services furnished were intensive treatment services, admission or related services, or equivalent services.     EXPECTED DISCHARGE DATE/DAY: TBD     DISPOSITION: Home       Signed By:   Maude Pickens MD  7/9/2017

## 2017-07-09 NOTE — PROGRESS NOTES
Problem: Aggression and Hostility (Behavioral Health)  Goal: *Demonstrate ability to comply with simple direction  Outcome: Not Progressing Towards Goal  Variance: Patient slowly responding to demonstrating the ability to comply with simple direction. Patient has labile affect, mood congruent, delusions of grandeur, paranoid, disorganized thoughts, poor insight, patient talks to self, at times is very restless, patient is refusing insulin, otherwise is med compliant, and meal compliant. Remains on Q15 min safety checks.

## 2017-07-09 NOTE — PROGRESS NOTES
Problem: Aggression and Hostility (Behavioral Health)  Goal: *Express anger or hostility appropriately (without verbal or physical aggression)  Outcome: Not Progressing Towards Goal  Variance: Patient unresponsive to therapy  Pt up in dining room visiting with . Irritable, confused, forgetful, threatening to hurt  if he touched her. Walks with head down, gait is steady. Will continue to monitor.

## 2017-07-09 NOTE — BH NOTES
2330-patient resting quietly in chair in dayroom. Respirations regular and even. Continue to monitor q 15 minutes for safety. 0535-patient up with assistance to her bathroom. Patient voided and had bm into commode. Missed the \"cap\" in the toilet that was to collect urine. Patient had smear of stool on her pull up and states \"oh, that was a flora bar I had in my pants. \"  Patient given a new pull up at this time and assisted back to dayroom to sit in recliner chair as per her request.  0600-Slept 3.5 hours. No prn meds given. 0655-attempted to draw patient's lab at this time and was unsuccessful x2.

## 2017-07-09 NOTE — BH NOTES
Note pt restless and noted \"talking to herself\"  Pacing at times,difficult to redirect. Refusing insulin ss.  aware

## 2017-07-09 NOTE — BH NOTES
Pt states\"GET YOUR ASS AWAY FROM ME!!!!\"  Note pt combative and aggressive  towards writer when trying to assist patient with hs medications and snack. Note pt \"punched\" at [address] when trying to redirect pt from sitting in other staff lap and\" pinching' staffs arm. Needed assistance of two staff members to calm pt. With some encouragement pt was able to take medication in applesauce.

## 2017-07-10 LAB
APPEARANCE UR: ABNORMAL
BACTERIA URNS QL MICRO: NEGATIVE /HPF
BILIRUB UR QL: NEGATIVE
COLOR UR: ABNORMAL
EPITH CASTS URNS QL MICRO: ABNORMAL /LPF
GLUCOSE BLD STRIP.AUTO-MCNC: 138 MG/DL (ref 65–100)
GLUCOSE BLD STRIP.AUTO-MCNC: 161 MG/DL (ref 65–100)
GLUCOSE BLD STRIP.AUTO-MCNC: 217 MG/DL (ref 65–100)
GLUCOSE BLD STRIP.AUTO-MCNC: 89 MG/DL (ref 65–100)
GLUCOSE UR STRIP.AUTO-MCNC: NEGATIVE MG/DL
HGB UR QL STRIP: NEGATIVE
KETONES UR QL STRIP.AUTO: NEGATIVE MG/DL
LEUKOCYTE ESTERASE UR QL STRIP.AUTO: ABNORMAL
NITRITE UR QL STRIP.AUTO: NEGATIVE
PH UR STRIP: 5.5 [PH] (ref 5–8)
PROT UR STRIP-MCNC: NEGATIVE MG/DL
RBC #/AREA URNS HPF: ABNORMAL /HPF (ref 0–5)
SERVICE CMNT-IMP: ABNORMAL
SERVICE CMNT-IMP: NORMAL
SP GR UR REFRACTOMETRY: 1.01 (ref 1–1.03)
UROBILINOGEN UR QL STRIP.AUTO: 0.2 EU/DL (ref 0.2–1)
WBC URNS QL MICRO: ABNORMAL /HPF (ref 0–4)

## 2017-07-10 PROCEDURE — 74011636637 HC RX REV CODE- 636/637: Performed by: HOSPITALIST

## 2017-07-10 PROCEDURE — 82962 GLUCOSE BLOOD TEST: CPT

## 2017-07-10 PROCEDURE — 81001 URINALYSIS AUTO W/SCOPE: CPT | Performed by: PSYCHIATRY & NEUROLOGY

## 2017-07-10 PROCEDURE — 74011250637 HC RX REV CODE- 250/637: Performed by: HOSPITALIST

## 2017-07-10 PROCEDURE — 74011250637 HC RX REV CODE- 250/637: Performed by: PSYCHIATRY & NEUROLOGY

## 2017-07-10 PROCEDURE — 65220000003 HC RM SEMIPRIVATE PSYCH

## 2017-07-10 RX ORDER — FUROSEMIDE 20 MG/1
20 TABLET ORAL ONCE
Status: COMPLETED | OUTPATIENT
Start: 2017-07-10 | End: 2017-07-10

## 2017-07-10 RX ADMIN — ASPIRIN 81 MG 81 MG: 81 TABLET ORAL at 10:36

## 2017-07-10 RX ADMIN — ATORVASTATIN CALCIUM 40 MG: 40 TABLET, FILM COATED ORAL at 10:36

## 2017-07-10 RX ADMIN — DONEPEZIL HYDROCHLORIDE 10 MG: 10 TABLET, FILM COATED ORAL at 21:35

## 2017-07-10 RX ADMIN — CHLORPROMAZINE HYDROCHLORIDE 25 MG: 25 TABLET, SUGAR COATED ORAL at 09:08

## 2017-07-10 RX ADMIN — GLIPIZIDE 5 MG: 5 TABLET ORAL at 09:08

## 2017-07-10 RX ADMIN — MEMANTINE HYDROCHLORIDE 10 MG: 10 TABLET ORAL at 10:37

## 2017-07-10 RX ADMIN — FUROSEMIDE 20 MG: 20 TABLET ORAL at 13:15

## 2017-07-10 RX ADMIN — MEMANTINE HYDROCHLORIDE 10 MG: 10 TABLET ORAL at 21:35

## 2017-07-10 RX ADMIN — NITROFURANTOIN MONOHYDRATE/MACROCRYSTALLINE 100 MG: 25; 75 CAPSULE ORAL at 21:35

## 2017-07-10 RX ADMIN — NITROFURANTOIN MONOHYDRATE/MACROCRYSTALLINE 100 MG: 25; 75 CAPSULE ORAL at 09:09

## 2017-07-10 RX ADMIN — INSULIN LISPRO 2 UNITS: 100 INJECTION, SOLUTION INTRAVENOUS; SUBCUTANEOUS at 07:53

## 2017-07-10 RX ADMIN — THERA TABS 1 TABLET: TAB at 10:37

## 2017-07-10 RX ADMIN — CHLORPROMAZINE HYDROCHLORIDE 25 MG: 25 TABLET, SUGAR COATED ORAL at 17:28

## 2017-07-10 RX ADMIN — INSULIN LISPRO 3 UNITS: 100 INJECTION, SOLUTION INTRAVENOUS; SUBCUTANEOUS at 17:29

## 2017-07-10 NOTE — PROGRESS NOTES
Problem: Altered Thought Process (Adult/Pediatric)  Goal: *STG: Participates in treatment plan  Outcome: Not Progressing Towards Goal  Variance: Patient Uncooperative  Comments: Pt is aggressive, paranoid, refuses staff interventions, says \"Go away! \"

## 2017-07-10 NOTE — BH NOTES
1148 Patient refused to have VS taken. Reported \"I already had a physical this AM\". 1408 Patient did not attend community meeting.

## 2017-07-10 NOTE — BH NOTES
Behavioral Health Interdisciplinary Rounds     Patient Name: Megan Dwyer  Age: 74 y.o. Room/Bed:  748/  Primary Diagnosis: Dementia   Admission Status: Involuntary Commitment     Readmission within 30 days: yes -transferred from another [place] [id number] in place: yes - copy on chart  Patient requires a blocked bed: yes           Reason for blocked bed: Contact Precautions    VTE Prophylaxis: Yes ASA  Mobility needs/Fall risk: yes    Nutritional Plan: no  Consults:  Urologist saw--he said if UTI symptoms reoccur, call him back, otherwise no treatment indicated                      Labs/Testing due today?: yes    Sleep hours: 5.00 + hours    Participation in Care/Groups:  no  Medication Compliant?: Refusing Psychiatric Medications and Refusing Medical Medications  PRNS (last 24 hours): Antipsychotic (PO) and Antianxiety PO Zyprexa and Ambien   Restraints (last 24 hours):  no  Substance Abuse:  no  CIWA (range last 24 hours): N/A COWS (range last 24 hours): N/A  Alcohol screening (AUDIT) completed -  AUDIT Score: 0  If applicable, date SBIRT discussed in treatment team AND documented: N/A  Tobacco - patient is a smoker: no   Date tobacco education completed by RN: N/A  24 hour chart check complete: yes     Patient goal(s) for today:   Treatment team focus/goals:  Attempt to get labs and urine specimen  LOS:  3  Expected LOS: TBD  Psychiatric Advanced Care Directives -  no   Name of Decision maker if patient has Psychiatric Care Directive None  Patient was offered information Patient Declined  Financial concerns/prescription coverage:  no  Date of last family contact: 7/9/17      Family requesting physician contact today:  Yes-- wants to talk with doctor ASAP  Discharge plan:   Return home with  when ready for discharge                         Outpatient provider(s): Dr. Carmela Duff (PCP)    Participating treatment team members: SURINDER Song - Dr. Marco A Umaña

## 2017-07-10 NOTE — PROGRESS NOTES
Pt alert, confused. Irritable. Pt refusing urine sample and blood draw. Nursing staff will attempt lab collection again during shift.

## 2017-07-10 NOTE — BH NOTES
PSYCHIATRIC PROGRESS NOTE         Patient Name  Alex Hong   Date of Birth 1950   [facility] [id number]   Medical Record Number  [id number]      Age  74 y.o. PCP Mark Verdugo MD   Admit date:  7/7/2017    Room Number  748/01  @ [facility]   Date of Service  7/10/2017          PSYCHOTHERAPY SESSION NOTE:  Length of psychotherapy session: 45 minutes    Main condition/diagnosis/issues treated during session today, 7/10/2017 : self care, anxiety, coping skills    I employed Cognitive Behavioral therapy techniques, Reality-Oriented psychotherapy, as well as supportive psychotherapy in regards to various ongoing psychosocial stressors, including the following: pre-admission and current problems; medical issues; and stress of hospitalization. Interpersonal relationship issues and psychodynamic conflicts explored. Attempts made to alleviate maladaptive patterns. Overall, patient is not progressing    Treatment Plan Update (reviewed an updated 7/10/2017) : I will modify psychotherapy tx plan by implementing more stress management strategies, building upon cognitive behavioral techniques, increasing coping skills, as well as shoring up psychological defenses). An extended energy and skill set was needed to engage pt in psychotherapy due to some of the following: resistiveness, complexity, negativity, confrontational nature, hostile behaviors, and/or severe abnormalities in thought processes/psychosis resulting in the loss of expressive/receptive language communication skills. E & M PROGRESS NOTE:         HISTORY       CC:  \"aggression R/O UTI\"  HISTORY OF PRESENT ILLNESS/INTERVAL HISTORY:  (reviewed/updated 7/10/2017). per initial evaluation:     Alex Hong presents/reports/evidences the following emotional symptoms today, 7/10/2017:agitation. The above symptoms have been present for years. These symptoms are of severe severity.  The symptoms are constant  in nature. Additional symptomatology and features include agitation and anger outbursts. 7/9/17- Very aggressive and oppositional. Is deliberately not urinating in hat to test urine sample. Poor insight. 7/10/17- Med and meal compliant. Cognitively and mood wise is at baseline. Has refused to cooperate with urine sample to rule out uti. SIDE EFFECTS: (reviewed/updated 7/10/2017)  None reported or admitted to.   No noted toxicity with use of Depakote/Tegretol/lithium/Clozaril/TCAs   ALLERGIES:(reviewed/updated 7/10/2017)  Allergies   Allergen Reactions    Ancef [Cefazolin] Unknown (comments)      could not report allergic reaction    Antihistamine Decongestant [Triprolidine-Pseudoephedrine] Other (comments)      could not report allergic reaction    Antihistamine [Diphenhydramine Hcl] Other (comments)      could not report allergic reaction    Bactrim [Sulfamethoprim] Unknown (comments)      could not report allergic reaction    Benicar [Olmesartan] Unknown (comments)      could not report allergic reaction    Benzocaine Unknown (comments)      could not report allergic reaction    Captopril Unknown (comments)      could not report allergic reaction    Codeine Phosphate Unknown (comments)      could not report allergic reaction    Codeine Sulfate Unknown (comments)      could not report allergic reaction    Demerol [Meperidine] Unknown (comments)      could not report allergic reaction    Dilantin [Phenytoin Sodium Extended] Unknown (comments)      could not report allergic reaction    Diovan [Valsartan] Unknown (comments)      could not report allergic reaction    Doxycycline Unknown (comments)      could not report allergic reaction    Lortab [Hydrocodone-Acetaminophen] Unknown (comments)      could not report allergic reaction    Metformin Unknown (comments)      could not report allergic reaction    Norvasc [Amlodipine] Unknown (comments)      could not report allergic reaction    Parnate [Tranylcypromine] Unknown (comments)      could not report allergic reaction    Penicillins Unknown (comments)      could not report allergic reaction    Percocet [Oxycodone-Acetaminophen] Unknown (comments)      could not report allergic reaction    Pravastatin Other (comments)     Headaches    Robaxin [Methocarbamol] Other (comments)      could not report allergic reaction      MEDICATIONS PRIOR TO ADMISSION:(reviewed/updated 7/10/2017)  Prescriptions Prior to Admission   Medication Sig    glipiZIDE (GLUCOTROL) 5 mg tablet Take 5 mg by mouth daily. Indications: type 2 diabetes mellitus    multivitamin (ONE A DAY) tablet Take 1 Tab by mouth daily.  FERROUS SULFATE (IRON PO) Take 1 Tab by mouth daily.  aspirin 81 mg chewable tablet Take 81 mg by mouth daily. Indications: prevention of cerebrovascular accident    chlorproMAZINE (THORAZINE) 25 mg tablet Take 25 mg by mouth three (3) times daily.  memantine-donepezil (NAMZARIC) 28-10 mg CSpX Take 1 Cap by mouth daily. Indications: DEMENTIA    atorvastatin (LIPITOR) 40 mg tablet Take 1 Tab by mouth daily. Indications: PREVENTION OF CEREBROVASCULAR ACCIDENT      PAST MEDICAL HISTORY: Past medical history from the initial psychiatric evaluation has been reviewed (reviewed/updated 7/10/2017) with no additional updates (I asked patient and no additional past medical history provided).    Past Medical History:   Diagnosis Date    Chronic kidney disease     stones in past    Diabetes ([facility] [address]     Hypertension     Psychiatric disorder     depression     Past Surgical History:   Procedure Laterality Date    HX OTHER SURGICAL      hysterectomy    HX OTHER SURGICAL      gallbladder    HX OTHER SURGICAL      elbows - bilateral screw placement      SOCIAL HISTORY: Social history from the initial psychiatric evaluation has been reviewed (reviewed/updated 7/10/2017) with no additional updates (I asked patient and no additional social history provided). Social History     Social History    Marital status:      Spouse name: N/A    Number of children: N/A    Years of education: N/A     Occupational History    Not on file. Social History Main Topics    Smoking status: Never Smoker    Smokeless tobacco: Never Used    Alcohol use No    Drug use: No    Sexual activity: Yes     Partners: Male     Other Topics Concern    Not on file     Social History Narrative    [id number] IMPAIRMENT IN THE CONTEXT OF WORSENING CEREBROVASCULAR DEMENTIA. Patient has had acute mental status change in addition, most likely due to UTI. During past [facility] admissions, 's family, Nursing Director, Social Work, Patient Advocate and her attending psychiatirst ALL concurred that patient needed placement in a SNF. Patient's  was against this recommendation because he believes the patient only needs psychotherapy, and doies not need medications. FAMILY HISTORY: Family history from the initial psychiatric evaluation has been reviewed (reviewed/updated 7/10/2017) with no additional updates (I asked patient and no additional family history provided).    Family History   Problem Relation Age of Onset    Diabetes Mother     Diabetes Father        REVIEW OF SYSTEMS: (reviewed/updated 7/10/2017)  Appetite:no change from normal   Sleep: no change   All other Review of Systems: Psychological ROS: positive for - anxiety  Cardiovascular ROS: no chest pain or dyspnea on exertion  Gastrointestinal ROS: no abdominal pain, change in bowel habits, or black or bloody stools         [address] [facility] ([facility]):    MSE FINDINGS ARE WITHIN NORMAL LIMITS (WNL) UNLESS OTHERWISE STATED BELOW. ( ALL OF THE BELOW CATEGORIES OF THE [facility] HAVE BEEN REVIEWED (reviewed 7/10/2017) AND UPDATED AS DEEMED APPROPRIATE )  General Presentation older than stated age, uncooperative   Orientation oriented to time, place and person   Vital Signs  See below (reviewed 7/10/2017); Vital Signs (BP, Pulse, & Temp) are within normal limits if not listed below.    Gait and Station Stable/steady, no ataxia   Musculoskeletal System No extrapyramidal symptoms (EPS); no abnormal muscular movements or Tardive Dyskinesia (TD); muscle strength and tone are within normal limits   Language No aphasia or dysarthria   Speech:  hypoverbal   Thought Processes concrete; slow rate of thoughts; poor abstract reasoning/computation   Thought Associations goal directed   Thought Content free of delusions   Suicidal Ideations none   Homicidal Ideations none   Mood:  angry, hostile  and irritable   Affect:  mood-congruent   Memory recent  poor   Memory remote:  impaired   Concentration/Attention:  inattentive   Fund of Knowledge below average   Insight:  poor   Reliability poor   Judgment:  limited          VITALS:     Patient Vitals for the past 24 hrs:   Temp Pulse Resp BP SpO2   07/10/17 0725 97.2 °F (36.2 °C) 93 18 143/85 99 %   07/09/17 2042 98.4 °F (36.9 °C) (!) 110 16 134/78 97 %   07/09/17 1619 97 °F (36.1 °C) 94 18 (!) 153/99 -     Wt Readings from Last 3 Encounters:   07/09/17 70 kg (154 lb 6.4 oz)   11/13/16 68.8 kg (151 lb 9.6 oz)     Temp Readings from Last 3 Encounters:   07/10/17 97.2 °F (36.2 °C)   11/18/16 98.1 °F (36.7 °C)   10/30/15 98.4 °F (36.9 °C)     BP Readings from Last 3 Encounters:   07/10/17 143/85   11/18/16 123/81   10/30/15 120/75     Pulse Readings from Last 3 Encounters:   07/10/17 93   11/18/16 92   10/30/15 99            DATA     LABORATORY DATA:(reviewed/updated 7/10/2017)  Recent Results (from the past 24 hour(s))   GLUCOSE, POC    Collection Time: 07/09/17 12:03 PM   Result Value Ref Range    Glucose (POC) 158 (H) 65 - 100 mg/dL    Performed by Isha Tello GLUCOSE, POC    Collection Time: 07/09/17  3:33 PM   Result Value Ref Range    Glucose (POC) 150 (H) 65 - 100 mg/dL    Performed by [address], POC    Collection Time: 07/10/17  7:37 AM   Result Value Ref Range    Glucose (POC) 161 (H) 65 - 100 mg/dL    Performed by Norman Mata      No results found for: VALF2, VALAC, VALP, VALPR, DS6, CRBAM, CRBAMP, CARB2, XCRBAM  No results found for: LITHM   RADIOLOGY REPORTS:(reviewed/updated 7/10/2017)  No results found.        MEDICATIONS     ALL MEDICATIONS:   Current Facility-Administered Medications   Medication Dose Route Frequency    furosemide (LASIX) tablet 20 mg  20 mg Oral ONCE    nitrofurantoin (macrocrystal-monohydrate) (MACROBID) capsule 100 mg  100 mg Oral BID    OLANZapine (ZyPREXA) tablet 2.5 mg  2.5 mg Oral Q6H PRN    ziprasidone (GEODON) 10 mg in sterile water (preservative free) 0.5 mL injection  10 mg IntraMUSCular BID PRN    benztropine (COGENTIN) tablet 1 mg  1 mg Oral BID PRN    benztropine (COGENTIN) injection 1 mg  1 mg IntraMUSCular BID PRN    LORazepam (ATIVAN) injection 0.5 mg  0.5 mg IntraMUSCular Q4H PRN    LORazepam (ATIVAN) tablet 0.5 mg  0.5 mg Oral Q4H PRN    zolpidem (AMBIEN) tablet 5 mg  5 mg Oral QHS PRN    ibuprofen (MOTRIN) tablet 400 mg  400 mg Oral Q8H PRN    magnesium hydroxide (MILK OF MAGNESIA) 400 mg/5 mL oral suspension 30 mL  30 mL Oral DAILY PRN    nicotine (NICODERM CQ) 21 mg/24 hr patch 1 Patch  1 Patch TransDERmal DAILY PRN    atorvastatin (LIPITOR) tablet 40 mg  40 mg Oral DAILY    aspirin chewable tablet 81 mg  81 mg Oral DAILY    therapeutic multivitamin (THERAGRAN) tablet 1 Tab  1 Tab Oral DAILY    ferrous sulfate tablet 325 mg  325 mg Oral DAILY WITH BREAKFAST    insulin lispro (HUMALOG) injection   SubCUTAneous AC&HS    glucose chewable tablet 16 g  4 Tab Oral PRN    glucagon (GLUCAGEN) injection 1 mg  1 mg IntraMUSCular PRN    glipiZIDE (GLUCOTROL) tablet 5 mg  5 mg Oral DAILY  donepezil (ARICEPT) tablet 10 mg  10 mg Oral QHS    memantine (NAMENDA) tablet 10 mg  10 mg Oral BID    dextrose 10 % infusion 125-250 mL  125-250 mL IntraVENous PRN    chlorproMAZINE (THORAZINE) tablet 25 mg  25 mg Oral BID      SCHEDULED MEDICATIONS:   Current Facility-Administered Medications   Medication Dose Route Frequency    furosemide (LASIX) tablet 20 mg  20 mg Oral ONCE    nitrofurantoin (macrocrystal-monohydrate) (MACROBID) capsule 100 mg  100 mg Oral BID    atorvastatin (LIPITOR) tablet 40 mg  40 mg Oral DAILY    aspirin chewable tablet 81 mg  81 mg Oral DAILY    therapeutic multivitamin (THERAGRAN) tablet 1 Tab  1 Tab Oral DAILY    ferrous sulfate tablet 325 mg  325 mg Oral DAILY WITH BREAKFAST    insulin lispro (HUMALOG) injection   SubCUTAneous AC&HS    glipiZIDE (GLUCOTROL) tablet 5 mg  5 mg Oral DAILY    donepezil (ARICEPT) tablet 10 mg  10 mg Oral QHS    memantine (NAMENDA) tablet 10 mg  10 mg Oral BID    chlorproMAZINE (THORAZINE) tablet 25 mg  25 mg Oral BID          ASSESSMENT & PLAN     DIAGNOSES REQUIRING ACTIVE TREATMENT AND MONITORING: (reviewed/updated 7/10/2017)  Patient Active Hospital Problem List:   Dementia (10/19/2016)    Assessment:cognitive, logical, language decline    Plan: continue current care   Schizoaffective disorder ([facility] [address] (7/7/2017)    Assessment: nila/depression alternating with psychosis    Plan: continue current care   UTI (urinary tract infection) (7/7/2017)    Assessment:bacteria and leukocyte esterase positive urine    Plan: repeat ua      PATIENT'S SPOUSE IS HER POWER OF  AND HAS STATED HE WANTS NO MEDICATION CHANGES OTHER THAN WHAT THE PATIENT IS CURRENTLY TAKING. In summary, Barbara Zayas, is a 74 y.o.  female who presents with a severe exacerbation of the principal diagnosis of Dementia  Patient's condition is worsening/not improving/not stable .   Patient requires continued inpatient hospitalization for further stabilization, safety monitoring and medication management. I will continue to coordinate the provision of individual, milieu, occupational, group, and substance abuse therapies to address target symptoms/diagnoses as deemed appropriate for the individual patient. A coordinated, multidisplinary treatment team round was conducted with the patient (this team consists of the nurse, psychiatric unit pharmcist,  and writer). Complete current electronic health record for patient has been reviewed today including consultant notes, ancillary staff notes, nurses and psychiatric tech notes. Suicide risk assessment completed and patient deemed to be of low risk for suicide at this time. The following regarding medications was addressed during rounds with patient:   the risks and benefits of the proposed medication. The patient was given the opportunity to ask questions. Informed consent given to the use of the above medications. Will continue to adjust psychiatric and non-psychiatric medications (see above \"medication\" section and orders section for details) as deemed appropriate & based upon diagnoses and response to treatment. I will continue to order blood tests/labs and diagnostic tests as deemed appropriate and review results as they become available (see orders for details and above listed lab/test results). I will order psychiatric records from previous [facility] to further elucidate the nature of patient's psychopathology and review once available. I will gather additional collateral information from friends, family and o/p treatment team to further elucidate the nature of patient's psychopathology and baselline level of psychiatric functioning.          I certify that this patient's inpatient psychiatric hospital services furnished since the previous certification were, and continue to be, required for treatment that could reasonably be expected to improve the patient's condition, or for diagnostic study, and that the patient continues to need, on a daily basis, active treatment furnished directly by or requiring the supervision of inpatient psychiatric facility personnel. In addition, the hospital records show that services furnished were intensive treatment services, admission or related services, or equivalent services.     EXPECTED DISCHARGE DATE/DAY: TBD     DISPOSITION: Home       Signed By:   Fariha Bateman MD  7/10/2017

## 2017-07-10 NOTE — BH NOTES
1626:  Luisa from [facility] called to report ecoli infection in pt's urine and confirm that pt is on antibiotics, will fax info. Urine sample obtained and sent to lab. Pt is labile and uncooperative. Verbally refusing toileting, food, physical activity. Pt will eat food and take medication if it is placed in front of her. Accepts insulin with encouragement. When asked if she would like to go to the bathroom, walk, or reposition, pt becomes irritable and states, \"No, go away and leave me alone! \", slaps staff's hands and arms. Pt watches tv or dozes in recliner. Mumbles to self, repeating earlier conversations with staff to self. Loose associations. When asked about , pt states \"I don't have a ! \". No evidence of hallucinations. Pt is generally steady and stand by assist.    2000:  Pt refusing toileting and repositioning. Accepts fluids. 2035:  Pt refusing repositioning and toileting, states \"I don't like you, now go away!\"  2100:  Pt refusing toileting and repositioning. Accepts fluids.

## 2017-07-10 NOTE — BH NOTES
GROUP THERAPY PROGRESS NOTE    Barbara Zayas is participating in [place]. Group time: 10 minutes    Personal goal for participation: Patient did not make a goal.  Focused on \"I am going home today\".     Goal orientation: personal    Group therapy participation: minimal    Therapeutic interventions reviewed and discussed: Reviewed unit schedule, rules and goals    Impression of participation: Required prompting and encouragement to particpate

## 2017-07-10 NOTE — BH NOTES
GROUP THERAPY PROGRESS NOTE    Nick Barger [facility] participated in a Morning Process Group on the Geriatric Unit, with a focus identifying feelings, planning for the day, and singing. Group time: 40 minutes. Personal goal for participation: To increase the capacity to shift ones mood, prepare for the day, and share in group singing. Goal orientation: The patient will be able to prepare for the day through group singing. Group therapy participation: When prompted, this patient partially participated in the group. Therapeutic interventions reviewed and discussed: The group members were introduce themselves by first names and participate in group singing as a way to increase their oxygen and blood flow and begin their day on a positive note. They were also asked to join in singing several songs. Impression of participation: The patient said she was \"planning to go [employer] (speaking of herself in the third person),\"  which she changed to \"going to [employer] and work on [employer]. ..sealing them for the summer. \" She also said she lived in the [facility] but came down to [place] to live with relatives because of the warmer climate but that she did not know if she was going back to the [facility]. She listened passively to the music and quietly seemed to be talking to herself. She expressed no SI/HI but seemed to be internally focused, but able to respond when prompted. Her affect appeared slightly anxious and her mood was cordial when not self-absorbed. She ended the session by talking about liking to plant flowers and looking for a way to say something nice to the undersigned, wishing me well and complimenting me on my clothes. Her thinking was scattered seemed only partially reality oriented.

## 2017-07-10 NOTE — PROGRESS NOTES
07/10/17 1212   Vital Signs   Temp 98.2 °F (36.8 °C)   Temp Source Oral   Pulse (Heart Rate) (!) 109   Heart Rate Source Monitor   Resp Rate 18   O2 Sat (%) 99 %   Level of Consciousness Alert   BP (!) 167/94   MAP (Calculated) 118   MEWS Score 2   POSS Scale   Opioid Sedation Scale 1   Oxygen Therapy   O2 Device Room air   one time Lasix 20 mg po given.

## 2017-07-10 NOTE — PROGRESS NOTES
Problem: Altered Thought Process (Adult/Pediatric)  Goal: *STG: Remains safe in hospital  Outcome: Progressing Towards Goal  Pt remains safe in hospital on q 15 min safety checks. Goal: *STG: Seeks staff when feelings of anxiety and fear arise  Outcome: Progressing Towards Goal  Pt verbalizing feelings. Pt anxious and confused. Goal: *STG: Attends activities and groups  Outcome: Progressing Towards Goal  Pt attending process group. Pt irritable. Confused. Fixed delusional thoughts. Pt refusing breakfast sent to unit. Pt refuses assist with meal. Pt eating 2 applesauce, drinking 118 mL orange juice and 118 mL apple juice      Pt refusing morning medications. Increased irritability. Redirection provided. Pt suspicious and demanding. Pt  compliant with scheduled thorazine, glipizide, MACROBID with much encouragement from staff. Therapeutic communication and education provided continuous. Staff will offer scheduled morning  Medications to pt again this morning. 1037- pt taking scheduled medication late due to pt refusing medications earlier: aspirin, lipitor, namenda, theragran (see MAR)    Sugar free ice cream 118 mL, diet ginger ale 222 mL x 3 oral intake in. Pt labile, mumbling to self, yelling out refusing interventions     Pt eating peanut butter crackers and peanut butter and jelly sandwich for lunch. Drinking water. 3 urinary inconstant occurences. Staff continues to attempt urine culture collection/lab work. Pt bathed with 1 person assist. Pt refuses to wash hair or allow assist with washing hair. Pt ambulating in soriano stand by assist.     +1 edema BLE; leg in dependent position. One time dose Lasix 20 mg given, BLE elevated.

## 2017-07-10 NOTE — BH NOTES
2300:  Patient received as she appears to be sleeping comfortably in the [place] chair in the [address] Room. Her respirations are even/unlabored, and there are no signs/symptoms of pain or discomfort at this time. Will maintain q 15 minute safety checks. 0600:  Patient awake and irritable - she is insistent that she is going home and is difficult to redirect out of the hallway and back onto the unit. She denies needing to use the bathroom, and becomes defiant with staff attempting to get the U/A stating \"I am clean - I am not sick\". Will continue to monitor.

## 2017-07-11 VITALS
DIASTOLIC BLOOD PRESSURE: 79 MMHG | WEIGHT: 154.4 LBS | TEMPERATURE: 97.9 F | OXYGEN SATURATION: 100 % | BODY MASS INDEX: 28.41 KG/M2 | SYSTOLIC BLOOD PRESSURE: 127 MMHG | HEART RATE: 98 BPM | HEIGHT: 62 IN | RESPIRATION RATE: 20 BRPM

## 2017-07-11 LAB
ALBUMIN SERPL BCP-MCNC: 3.3 G/DL (ref 3.5–5)
ALBUMIN/GLOB SERPL: 1.2 {RATIO} (ref 1.1–2.2)
ALP SERPL-CCNC: 99 U/L (ref 45–117)
ALT SERPL-CCNC: 26 U/L (ref 12–78)
ANION GAP BLD CALC-SCNC: 9 MMOL/L (ref 5–15)
AST SERPL W P-5'-P-CCNC: 22 U/L (ref 15–37)
BILIRUB SERPL-MCNC: 0.7 MG/DL (ref 0.2–1)
BUN SERPL-MCNC: 13 MG/DL (ref 6–20)
BUN/CREAT SERPL: 15 (ref 12–20)
CALCIUM SERPL-MCNC: 8.9 MG/DL (ref 8.5–10.1)
CHLORIDE SERPL-SCNC: 106 MMOL/L (ref 97–108)
CO2 SERPL-SCNC: 28 MMOL/L (ref 21–32)
CREAT SERPL-MCNC: 0.89 MG/DL (ref 0.55–1.02)
GLOBULIN SER CALC-MCNC: 2.8 G/DL (ref 2–4)
GLUCOSE BLD STRIP.AUTO-MCNC: 130 MG/DL (ref 65–100)
GLUCOSE BLD STRIP.AUTO-MCNC: 138 MG/DL (ref 65–100)
GLUCOSE BLD STRIP.AUTO-MCNC: 359 MG/DL (ref 65–100)
GLUCOSE P FAST SERPL-MCNC: 135 MG/DL (ref 65–100)
GLUCOSE SERPL-MCNC: 135 MG/DL (ref 65–100)
POTASSIUM SERPL-SCNC: 3.8 MMOL/L (ref 3.5–5.1)
PROT SERPL-MCNC: 6.1 G/DL (ref 6.4–8.2)
SERVICE CMNT-IMP: ABNORMAL
SODIUM SERPL-SCNC: 143 MMOL/L (ref 136–145)
TSH SERPL DL<=0.05 MIU/L-ACNC: 0.84 UIU/ML (ref 0.36–3.74)

## 2017-07-11 PROCEDURE — 84443 ASSAY THYROID STIM HORMONE: CPT | Performed by: PSYCHIATRY & NEUROLOGY

## 2017-07-11 PROCEDURE — 74011250637 HC RX REV CODE- 250/637: Performed by: PSYCHIATRY & NEUROLOGY

## 2017-07-11 PROCEDURE — 82947 ASSAY GLUCOSE BLOOD QUANT: CPT | Performed by: PSYCHIATRY & NEUROLOGY

## 2017-07-11 PROCEDURE — 74011636637 HC RX REV CODE- 636/637: Performed by: HOSPITALIST

## 2017-07-11 PROCEDURE — 82962 GLUCOSE BLOOD TEST: CPT | Performed by: PSYCHIATRY & NEUROLOGY

## 2017-07-11 PROCEDURE — 74011250637 HC RX REV CODE- 250/637: Performed by: HOSPITALIST

## 2017-07-11 PROCEDURE — 36415 COLL VENOUS BLD VENIPUNCTURE: CPT | Performed by: PSYCHIATRY & NEUROLOGY

## 2017-07-11 PROCEDURE — 80053 COMPREHEN METABOLIC PANEL: CPT | Performed by: PSYCHIATRY & NEUROLOGY

## 2017-07-11 PROCEDURE — 82962 GLUCOSE BLOOD TEST: CPT

## 2017-07-11 RX ORDER — NITROFURANTOIN 25; 75 MG/1; MG/1
100 CAPSULE ORAL 2 TIMES DAILY
Qty: 6 CAP | Refills: 0 | Status: SHIPPED | OUTPATIENT
Start: 2017-07-11

## 2017-07-11 RX ORDER — GUAIFENESIN 100 MG/5ML
81 LIQUID (ML) ORAL DAILY
Qty: 30 TAB | Refills: 0 | Status: SHIPPED | OUTPATIENT
Start: 2017-07-11 | End: 2017-07-11

## 2017-07-11 RX ADMIN — INSULIN LISPRO 7 UNITS: 100 INJECTION, SOLUTION INTRAVENOUS; SUBCUTANEOUS at 17:11

## 2017-07-11 RX ADMIN — NITROFURANTOIN MONOHYDRATE/MACROCRYSTALLINE 100 MG: 25; 75 CAPSULE ORAL at 08:32

## 2017-07-11 RX ADMIN — CHLORPROMAZINE HYDROCHLORIDE 25 MG: 25 TABLET, SUGAR COATED ORAL at 08:32

## 2017-07-11 RX ADMIN — MEMANTINE HYDROCHLORIDE 10 MG: 10 TABLET ORAL at 08:32

## 2017-07-11 RX ADMIN — ATORVASTATIN CALCIUM 40 MG: 40 TABLET, FILM COATED ORAL at 08:32

## 2017-07-11 RX ADMIN — GLIPIZIDE 5 MG: 5 TABLET ORAL at 08:32

## 2017-07-11 RX ADMIN — ASPIRIN 81 MG 81 MG: 81 TABLET ORAL at 08:32

## 2017-07-11 RX ADMIN — CHLORPROMAZINE HYDROCHLORIDE 25 MG: 25 TABLET, SUGAR COATED ORAL at 17:11

## 2017-07-11 RX ADMIN — THERA TABS 1 TABLET: TAB at 08:32

## 2017-07-11 RX ADMIN — FERROUS SULFATE TAB 325 MG (65 MG ELEMENTAL FE) 325 MG: 325 (65 FE) TAB at 08:32

## 2017-07-11 NOTE — BH NOTES
1606 Received patient resting quietly in bed with eyes closed. NAD. On q 15 min. Safety checks. 4431 Patient did not like her food, argued with staff. Staff gave her jelly with breakfast and she became calmer. 0900 Patient required much encouragement to take her scheduled meds. She was med compliant. 1025 Patient refused to recline in recliner to elevate legs. She has refused three times thus far to elevate feet. [address]  staff to obtain Blood sugar. Calm and cooperative. 1215 Patient has been calm and cooperative, allowed staff to obtain VS and Blood sugar. She is eating lunch in DR with peers.

## 2017-07-11 NOTE — PROGRESS NOTES
Problem: Altered Thought Process (Adult/Pediatric)  Goal: *STG: Participates in treatment plan  Outcome: Progressing Towards Goal  Patient participates in treatment. A and O to name. Med. And meal  Compliant. Goal: *STG: Remains safe in hospital  Outcome: Progressing Towards Goal  Patient has remained safe in hospital. On q 15 min. Safety checks. Goal: *STG: Seeks staff when feelings of anxiety and fear arise  Outcome: Progressing Towards Goal  Patient is able to verbalize her anxiety  ask staff for assistance. Goal: *STG: Complies with medication therapy  Outcome: Not Progressing Towards Goal  Patient has been inconsistent with having Blood sugars taken and taking scheduled meds. Confused and paranoid.

## 2017-07-11 NOTE — PROGRESS NOTES
Pharmacist Discharge Medication Reconciliation    Discharging Provider: Dr. Jackie Long PMH:   Past Medical History:   Diagnosis Date    Chronic kidney disease     stones in past    Diabetes ([address]     Hypertension     Psychiatric disorder     depression     Chief Complaint for this Admission: No chief complaint on file. Allergies: Ancef [cefazolin]; Antihistamine decongestant [triprolidine-pseudoephedrine]; Antihistamine [diphenhydramine hcl]; Bactrim [sulfamethoprim]; Evia Baas; Benzocaine; Captopril; Codeine phosphate; Codeine sulfate; Demerol [meperidine]; Dilantin [phenytoin sodium extended]; Diovan [valsartan]; Doxycycline; Lortab [hydrocodone-acetaminophen]; Metformin; Norvasc [amlodipine]; Parnate [tranylcypromine]; Penicillins; Percocet [oxycodone-acetaminophen]; Pravastatin; and Robaxin [methocarbamol]    Discharge Medications:   Current Discharge Medication List        START taking these medications    Details   nitrofurantoin, macrocrystal-monohydrate, (MACROBID) 100 mg capsule Take 1 Cap by mouth two (2) times a day. Indications: BACTERIAL URINARY TRACT INFECTION  Qty: 6 Cap, Refills: 0           CONTINUE these medications which have NOT CHANGED    Details   glipiZIDE (GLUCOTROL) 5 mg tablet Take 5 mg by mouth daily. Indications: type 2 diabetes mellitus      multivitamin (ONE A DAY) tablet Take 1 Tab by mouth daily. FERROUS SULFATE (IRON PO) Take 1 Tab by mouth daily. aspirin 81 mg chewable tablet Take 81 mg by mouth daily. Indications: prevention of cerebrovascular accident      chlorproMAZINE (THORAZINE) 25 mg tablet Take 25 mg by mouth three (3) times daily. memantine-donepezil (NAMZARIC) 28-10 mg CSpX Take 1 Cap by mouth daily. Indications: DEMENTIA      atorvastatin (LIPITOR) 40 mg tablet Take 1 Tab by mouth daily.  Indications: PREVENTION OF CEREBROVASCULAR ACCIDENT  Qty: 15 Tab, Refills: 0             The patient's chart, MAR and AVS were reviewed by Radha Soto Jessi Umanzor, PHARMD.

## 2017-07-11 NOTE — BH NOTES
Behavioral Health Transition Record to Provider    Patient Name: Barrington Mendez  YOB: 1950  Medical Record Number: [id number]  Date of Admission: 7/7/2017  Date of Discharge: 7/11/2017     Attending Provider: Fariha Bateman MD  Discharging Provider: Dr. Aroldo Marquez   To contact this individual call [phone]  and ask the  to page. If unavailable, ask to be transferred to [facility] Provider on call. [facility] Provider will be available on call 24/7 and during holidays.     Primary Care Provider: Suzanne Santana MD    Allergies   Allergen Reactions    Ancef [Cefazolin] Unknown (comments)      could not report allergic reaction    Antihistamine Decongestant [Triprolidine-Pseudoephedrine] Other (comments)      could not report allergic reaction    Antihistamine [Diphenhydramine Hcl] Other (comments)      could not report allergic reaction    Bactrim [Sulfamethoprim] Unknown (comments)      could not report allergic reaction    Benicar [Olmesartan] Unknown (comments)      could not report allergic reaction    Benzocaine Unknown (comments)      could not report allergic reaction    Captopril Unknown (comments)      could not report allergic reaction    Codeine Phosphate Unknown (comments)      could not report allergic reaction    Codeine Sulfate Unknown (comments)      could not report allergic reaction    Demerol [Meperidine] Unknown (comments)      could not report allergic reaction    Dilantin [Phenytoin Sodium Extended] Unknown (comments)      could not report allergic reaction    Diovan [Valsartan] Unknown (comments)      could not report allergic reaction    Doxycycline Unknown (comments)      could not report allergic reaction    Lortab [Hydrocodone-Acetaminophen] Unknown (comments)      could not report allergic reaction    Metformin Unknown (comments)      could not report allergic reaction    Norvasc [Amlodipine] Unknown (comments)      could not report allergic reaction    Parnate [Tranylcypromine] Unknown (comments)      could not report allergic reaction    Penicillins Unknown (comments)      could not report allergic reaction    Percocet [Oxycodone-Acetaminophen] Unknown (comments)      could not report allergic reaction    Pravastatin Other (comments)     Headaches    Robaxin [Methocarbamol] Other (comments)      could not report allergic reaction          H&P Summary Notes      H&P by Anel Burrows MD at 07/07/17 1203     Author:  Anel Burrows MD Service:  PSYCHIATRY Author Type:  Physician    Filed:  07/07/17 1641 Date of Service:  07/07/17 1203 Status:  Signed    :  Anel Burrows MD (Physician)             INITIAL PSYCHIATRIC EVALUATION         IDENTIFICATION:    Patient Name  Gil Root   Date of Birth 1950   [facility] [id number]   Medical Record Number  [id number]      Age  74 y.o. PCP Griselda Antonio MD   Admit date:  7/7/2017    Room Number  748/01  @ [facility]   Date of Service  7/7/2017            HISTORY         REASON FOR HOSPITALIZATION:  CC: \"cognitive decline and aggression in serttng of \". Pt admitted under a voluntary basis for advancing cerebrovascular dementia with behavioral disturbance  an inability to care for self. HISTORY OF PRESENT ILLNESS:    The patient, Gil Root, is a 74 y.o. WHITE [place]  female with a past psychiatric history significant for Dementia, who presents at this time with complaints of (and/or evidence of) the following emotional symptoms: depression. Additional symptomatology include behavioral disturbance and advancing dementia. The above symptoms have been present for years. These symptoms are of severe severity. These symptoms are constant  fleeting in nature.   The patient's condition has been precipitated by a UTI and psychosocial stressors (marital problems ). Patient's condition made worse by treatment noncompliance. UDS: negative; BAL=0.     ALLERGIES:  Allergies   Allergen Reactions    Ancef [Cefazolin] Unknown (comments)      could not report allergic reaction    Antihistamine Decongestant [Triprolidine-Pseudoephedrine] Other (comments)      could not report allergic reaction    Antihistamine [Diphenhydramine Hcl] Other (comments)      could not report allergic reaction    Bactrim [Sulfamethoprim] Unknown (comments)      could not report allergic reaction    Benicar [Olmesartan] Unknown (comments)      could not report allergic reaction    Benzocaine Unknown (comments)      could not report allergic reaction    Captopril Unknown (comments)      could not report allergic reaction    Codeine Phosphate Unknown (comments)      could not report allergic reaction    Codeine Sulfate Unknown (comments)      could not report allergic reaction    Demerol [Meperidine] Unknown (comments)      could not report allergic reaction    Dilantin [Phenytoin Sodium Extended] Unknown (comments)      could not report allergic reaction    Diovan [Valsartan] Unknown (comments)      could not report allergic reaction    Doxycycline Unknown (comments)      could not report allergic reaction    Lortab [Hydrocodone-Acetaminophen] Unknown (comments)      could not report allergic reaction    Metformin Unknown (comments)      could not report allergic reaction    Norvasc [Amlodipine] Unknown (comments)      could not report allergic reaction    Parnate [Tranylcypromine] Unknown (comments)      could not report allergic reaction    Penicillins Unknown (comments)      could not report allergic reaction    Percocet [Oxycodone-Acetaminophen] Unknown (comments)      could not report allergic reaction    Pravastatin Other (comments)     Headaches    Robaxin [Methocarbamol] Other (comments)      could not report allergic reaction      MEDICATIONS PRIOR TO ADMISSION:  Prescriptions Prior to Admission   Medication Sig    glipiZIDE (GLUCOTROL) 5 mg tablet Take 5 mg by mouth daily. Indications: type 2 diabetes mellitus    multivitamin (ONE A DAY) tablet Take 1 Tab by mouth daily.  FERROUS SULFATE (IRON PO) Take 1 Tab by mouth daily.  chlorproMAZINE (THORAZINE) 25 mg tablet Take 25 mg by mouth three (3) times daily.  memantine-donepezil (NAMZARIC) 28-10 mg CSpX Take 1 Cap by mouth daily. Indications: DEMENTIA    aspirin 81 mg chewable tablet Take 1 Tab by mouth daily. Indications: anxiety    atorvastatin (LIPITOR) 40 mg tablet Take 1 Tab by mouth daily. Indications: PREVENTION OF CEREBROVASCULAR ACCIDENT      PAST MEDICAL HISTORY:  Past Medical History:   Diagnosis Date    Chronic kidney disease     stones in past    Diabetes ([facility] [address]     Hypertension     Psychiatric disorder     depression     Past Surgical History:   Procedure Laterality Date    HX OTHER SURGICAL      hysterectomy    HX OTHER SURGICAL      gallbladder    HX OTHER SURGICAL      elbows - bilateral screw placement      SOCIAL HISTORY:    Social History     Social History    Marital status:      Spouse name: N/A    Number of children: N/A    Years of education: N/A     Occupational History    Not on file. Social History Main Topics    Smoking status: Never Smoker    Smokeless tobacco: Never Used    Alcohol use No    Drug use: No    Sexual activity: Yes     Partners: Male     Other Topics Concern    Not on file     Social History Narrative    [id number] IMPAIRMENT IN THE CONTEXT OF WORSENING CEREBROVASCULAR DEMENTIA. Patient has had acute mental status change in addition, most likely due to UTI.  During past [facility] admissions, 's family, Nursing Director, Social Work, Patient Advocate and her attending psychiatirst ALL concurred that patient needed placement in a SNF. Patient's  was against this recommendation because he believes the patient only needs psychotherapy, and doies not need medications. FAMILY HISTORY: History reviewed. No pertinent family history. Family History   Problem Relation Age of Onset    Diabetes Mother     Diabetes Father        REVIEW OF SYSTEMS:[MH1.1]   Psychological ROS: positive for - dementia  Respiratory ROS: no cough, shortness of breath, or wheezing  Cardiovascular ROS: no chest pain or dyspnea on exertion[MH1.2]  Pertinent items are noted in the History of Present Illness. All other Systems reviewed and are considered negative. MENTAL STATUS EXAM & VITALS         MENTAL STATUS EXAM (MSE):    MSE FINDINGS ARE WITHIN NORMAL LIMITS (WNL) UNLESS OTHERWISE STATED BELOW. ( ALL OF THE BELOW CATEGORIES OF THE MSE HAVE BEEN REVIEWED (reviewed 7/7/2017) AND UPDATED AS DEEMED APPROPRIATE )  General Presentation[MH1.1] age appropriate[MH1.2],[MH1.1] evasive[MH1.2]   Orientation[MH1.1] Alert and Oriented x 1[MH1.2]   Vital Signs  See below (reviewed 7/7/2017); Vital Signs (BP, Pulse, & Temp) are within normal limits if not listed below. Gait and Station Stable/steady, no ataxia   Musculoskeletal System No extrapyramidal symptoms (EPS); no abnormal muscular movements or Tardive Dyskinesia (TD); muscle strength and tone are within normal limits   Language No aphasia or dysarthria   Speech:[MH1.1]  hypoverbal[MH1.2]   Thought Processes[MH1.1] concrete[MH1.2]; [MH1.1] slow rate of thoughts[MH1.2]; [MH1.1] poor abstract reasoning/computation[MH1.2]   Thought Associations[MH1.1] blocked[MH1.2]    Thought Content[MH1.1] paranoid delusions[MH1.2]   Suicidal Ideations[MH1.1] none[MH1.2]   Homicidal Ideations[MH1.1] none[MH1.2]   Mood:[MH1.1]  hostile  and irritable[MH1.2] Affect:[MH1.1]  mood-congruent[MH1.2]   Memory recent[MH1.1]  impaired[MH1.2]   Memory remote:[MH1.1]  impaired[MH1.2]   Concentration/Attention:[MH1.1]  poor[MH1.2]   Fund of Knowledge[MH1.1] below average[MH1.2]   Insight:[MH1.1]  poor[MH1.2]   Reliability[MH1.1] poor[MH1.2]   Judgment:[MH1.1]  poor[MH1.2]            VITALS:     Patient Vitals for the past 24 hrs:   Temp Pulse Resp BP SpO2   07/07/17 0825 97.6 °F (36.4 °C) 82 16 127/84 99 %     Wt Readings from Last 3 Encounters:   11/13/16 68.8 kg (151 lb 9.6 oz)     Temp Readings from Last 3 Encounters:   07/07/17 97.6 °F (36.4 °C)   11/18/16 98.1 °F (36.7 °C)   10/30/15 98.4 °F (36.9 °C)     BP Readings from Last 3 Encounters:   07/07/17 127/84   11/18/16 123/81   10/30/15 120/75     Pulse Readings from Last 3 Encounters:   07/07/17 82   11/18/16 92   10/30/15 99            DATA       LABORATORY DATA:  Labs Reviewed - No data to display  No visits with results within 2 Day(s) from this visit. Latest known visit with results is:    Admission on 10/19/2016, Discharged on 11/18/2016   No results displayed because visit has over 200 results. RADIOLOGY REPORTS:    Results from Hospital Encounter encounter on 10/19/16   XR CHEST PORT   Narrative INDICATION:  AMS     EXAM: Chest single view. COMPARISON: None. Viola Talbert FINDINGS: A single frontal view of the chest at 1525 hours shows clear lungs. The left lung apex is partially obscured by the patient's chin. The heart,  mediastinum and pulmonary vasculature are normal    .  The bony thorax is  unremarkable for age. .         Impression IMPRESSION:  No acute cardiopulmonary disease radiographically. .  . No results found.            MEDICATIONS       ALL MEDICATIONS  Current Facility-Administered Medications   Medication Dose Route Frequency    nitrofurantoin (macrocrystal-monohydrate) (MACROBID) capsule 100 mg  100 mg Oral BID    OLANZapine (ZyPREXA) tablet 2.5 mg  2.5 mg Oral Q6H PRN    ziprasidone (GEODON) 10 mg in sterile water (preservative free) 0.5 mL injection  10 mg IntraMUSCular BID PRN    benztropine (COGENTIN) tablet 1 mg  1 mg Oral BID PRN    benztropine (COGENTIN) injection 1 mg  1 mg IntraMUSCular BID PRN    LORazepam (ATIVAN) injection 0.5 mg  0.5 mg IntraMUSCular Q4H PRN    LORazepam (ATIVAN) tablet 0.5 mg  0.5 mg Oral Q4H PRN    zolpidem (AMBIEN) tablet 5 mg  5 mg Oral QHS PRN    ibuprofen (MOTRIN) tablet 400 mg  400 mg Oral Q8H PRN    magnesium hydroxide (MILK OF MAGNESIA) 400 mg/5 mL oral suspension 30 mL  30 mL Oral DAILY PRN    nicotine (NICODERM CQ) 21 mg/24 hr patch 1 Patch  1 Patch TransDERmal DAILY PRN    [START ON 7/8/2017] atorvastatin (LIPITOR) tablet 40 mg  40 mg Oral DAILY    [START ON 7/8/2017] aspirin chewable tablet 81 mg  81 mg Oral DAILY    [START ON 7/8/2017] therapeutic multivitamin (THERAGRAN) tablet 1 Tab  1 Tab Oral DAILY    [START ON 7/8/2017] ferrous sulfate tablet 325 mg  325 mg Oral DAILY WITH BREAKFAST    insulin lispro (HUMALOG) injection   SubCUTAneous AC&HS    glucose chewable tablet 16 g  4 Tab Oral PRN    glucagon (GLUCAGEN) injection 1 mg  1 mg IntraMUSCular PRN    glipiZIDE (GLUCOTROL) tablet 5 mg  5 mg Oral DAILY    donepezil (ARICEPT) tablet 10 mg  10 mg Oral QHS    memantine (NAMENDA) tablet 10 mg  10 mg Oral BID    dextrose 10 % infusion 125-250 mL  125-250 mL IntraVENous PRN      SCHEDULED MEDICATIONS  Current Facility-Administered Medications   Medication Dose Route Frequency    nitrofurantoin (macrocrystal-monohydrate) (MACROBID) capsule 100 mg  100 mg Oral BID    [START ON 7/8/2017] atorvastatin (LIPITOR) tablet 40 mg  40 mg Oral DAILY    [START ON 7/8/2017] aspirin chewable tablet 81 mg  81 mg Oral DAILY    [START ON 7/8/2017] therapeutic multivitamin (THERAGRAN) tablet 1 Tab  1 Tab Oral DAILY    [START ON 7/8/2017] ferrous sulfate tablet 325 mg  325 mg Oral DAILY WITH BREAKFAST    insulin lispro (HUMALOG) injection SubCUTAneous AC&HS    glipiZIDE (GLUCOTROL) tablet 5 mg  5 mg Oral DAILY    donepezil (ARICEPT) tablet 10 mg  10 mg Oral QHS    memantine (NAMENDA) tablet 10 mg  10 mg Oral BID                ASSESSMENT & PLAN        The patient Soraya Fernandez is a 74 y.o.  female who presents at this time for treatment of the following diagnoses:  Patient C/Cami Mcdermott 1106 Problem List:   Dementia (10/19/2016)    Assessment:[MH1.1] cognitive decline with verbal and problem solving deficits, due to cerebrovascular DZ[MH1.2]    Plan:[MH1.1] Namzeric[MH1.2]   Schizoaffective disorder ([facility] [address] (7/7/2017)    Assessment:[MH1.1] cameron/ depression alternating with psychosis[MH1.2]    Plan:[MH1.1] thorazine[MH1.2]          In summary, Soraya Fernandez presents with a severe exacerbation of the principal diagnosis, Dementia    While on the unit Soraya Fernandez will be provided with individual, milieu, occupational, group, and substance abuse therapies to address target symptoms as deemed appropriate for the individual patient. I agree with decision to admit patient. I have spoken to [facility] psychiatric /ED staff regarding the nature of patients's admission at this time. A coordinated, multidisplinary treatment team (includes the nurse, unit pharmcist,  and writer) round was conducted for this initial evaluation with the patient present. The following regarding medications was addressed during rounds with patient:   the risks and benefits of the proposed medication. The patient was given the opportunity to ask questions. Informed consent given to the use of the above medications. I will continue to adjust psychiatric and non-psychiatric medications (see above \"medication\" section and orders section for details) as deemed appropriate & based upon diagnoses and response to treatment.      I have reviewed admission (and previous/old) labs and medical tests in the EHR and or transferring hospital documents. I will continue to order blood tests/labs and diagnostic tests as deemed appropriate and review results as they become available (see orders for details). I have reviewed old psychiatric and medical records available in the EHR. I Will order additional psychiatric records from other institutions to further elucidate the nature of patient's psychopathology and review once available. I will gather additional collateral information from friends, family and o/p treatment team to further elucidate the nature of patient's psychopathology and baselline level of psychiatric functioning.         ESTIMATED LENGTH OF STAY:[MH1.1]   1-2[MH1.2] days       STRENGTHS:[MH1.1]  Access to housing/residential stability and Interpersonal/supportive relationships (family, friends, peers)[MH1.2]                      SIGNED:    Dahiana Villa MD  7/7/2017[MH1.1]                Revision History       User Key Date/Time User Provider Type Action    > MH1.2 07/07/17 1641 Dahiana Villa MD Physician Sign     MH1.1 07/07/17 1203 Dahiana Villa MD Physician               Admission Diagnosis: schizoaffective disorder  Schizoaffective disorder ([facility] [address]    * No surgery found *    Results for orders placed or performed during the hospital encounter of 07/07/17   URINALYSIS W/MICROSCOPIC   Result Value Ref Range    Color YELLOW/STRAW      Appearance CLOUDY (A) CLEAR      Specific gravity 1.012 1.003 - 1.030      pH (UA) 5.5 5.0 - 8.0      Protein NEGATIVE  NEG mg/dL    Glucose NEGATIVE  NEG mg/dL    Ketone NEGATIVE  NEG mg/dL    Bilirubin NEGATIVE  NEG      Blood NEGATIVE  NEG      Urobilinogen 0.2 0.2 - 1.0 EU/dL    Nitrites NEGATIVE  NEG      Leukocyte Esterase TRACE (A) NEG      WBC 0-4 0 - 4 /hpf    RBC 0-5 0 - 5 /hpf    Epithelial cells MODERATE (A) FEW /lpf    Bacteria NEGATIVE  NEG /hpf   GLUCOSE, FASTING   Result Value Ref Range    Glucose 135 (H) 65 - 861 MG/DL   METABOLIC PANEL, COMPREHENSIVE   Result Value Ref Range    Sodium 143 136 - 145 mmol/L    Potassium 3.8 3.5 - 5.1 mmol/L    Chloride 106 97 - 108 mmol/L    CO2 28 21 - 32 mmol/L    Anion gap 9 5 - 15 mmol/L    Glucose 135 (H) 65 - 100 mg/dL    BUN 13 6 - 20 MG/DL    Creatinine 0.89 0.55 - 1.02 MG/DL    BUN/Creatinine ratio 15 12 - 20      GFR est AA >60 >60 ml/min/1.73m2    GFR est non-AA >60 >60 ml/min/1.73m2    Calcium 8.9 8.5 - 10.1 MG/DL    Bilirubin, total 0.7 0.2 - 1.0 MG/DL    ALT (SGPT) 26 12 - 78 U/L    AST (SGOT) 22 15 - 37 U/L    Alk.  phosphatase 99 45 - 117 U/L    Protein, total 6.1 (L) 6.4 - 8.2 g/dL    Albumin 3.3 (L) 3.5 - 5.0 g/dL    Globulin 2.8 2.0 - 4.0 g/dL    A-G Ratio 1.2 1.1 - 2.2     TSH 3RD GENERATION   Result Value Ref Range    TSH 0.84 0.36 - 3.74 uIU/mL   GLUCOSE, POC   Result Value Ref Range    Glucose (POC) 141 (H) 65 - 100 mg/dL    Performed by [employer]    GLUCOSE, POC   Result Value Ref Range    Glucose (POC) 142 (H) 65 - 100 mg/dL    Performed by [facility]    GLUCOSE, POC   Result Value Ref Range    Glucose (POC) 104 (H) 65 - 100 mg/dL    Performed by [facility]    GLUCOSE, POC   Result Value Ref Range    Glucose (POC) 148 (H) 65 - 100 mg/dL    Performed by Aidee Granger    GLUCOSE, POC   Result Value Ref Range    Glucose (POC) 158 (H) 65 - 100 mg/dL    Performed by Aidee Granger    GLUCOSE, POC   Result Value Ref Range    Glucose (POC) 150 (H) 65 - 100 mg/dL    Performed by [facility]    GLUCOSE, POC   Result Value Ref Range    Glucose (POC) 161 (H) 65 - 100 mg/dL    Performed by Viviane CAMARGO    GLUCOSE, POC   Result Value Ref Range    Glucose (POC) 138 (H) 65 - 100 mg/dL    Performed by Herminio Martinez.    GLUCOSE, POC   Result Value Ref Range    Glucose (POC) 217 (H) 65 - 100 mg/dL    Performed by Jaky Mari., POC   Result Value Ref Range    Glucose (POC) 89 65 - 100 mg/dL    Performed by Sue Buchanan    GLUCOSE, POC   Result Value Ref Range    Glucose (POC) 138 (H) 65 - 100 mg/dL    Performed by [facility]    GLUCOSE, POC   Result Value Ref Range    Glucose (POC) 130 (H) 65 - 100 mg/dL    Performed by [employer]        Immunizations administered during this encounter: There is no immunization history for the selected administration types on file for this patient. Screening for Metabolic Disorders for Patients on Antipsychotic Medications    Estimated Body Mass Index  Estimated body mass index is 28.24 kg/(m^2) as calculated from the following:    Height as of 10/26/16: 5' 2\" (1.575 m). Weight as of this encounter: 70 kg (154 lb 6.4 oz). Vital Signs/Blood Pressure  Visit Vitals    /82    Pulse 96    Temp 97.5 °F (36.4 °C)    Resp 16    Ht 5' 2\" (1.575 m)    Wt 70 kg (154 lb 6.4 oz)    SpO2 97%    Breastfeeding No    BMI 28.24 kg/m2       Blood Glucose/Hemoglobin A1c  Lab Results   Component Value Date/Time    Glucose 135 2017 07:00 AM    Glucose 135 2017 07:00 AM    Glucose (POC) 130 2017 11:54 AM        Lab Results   Component Value Date/Time    Hemoglobin A1c 6.8 10/20/2016 06:51 AM        Lipid Panel  Lab Results   Component Value Date/Time    Cholesterol, total 224 10/22/2016 06:28 AM    HDL Cholesterol 36 10/22/2016 06:28 AM    LDL, calculated 150.6 10/22/2016 06:28 AM    Triglyceride 187 10/22/2016 06:28 AM    CHOL/HDL Ratio 6.2 10/22/2016 06:28 AM            Discharge Diagnosis: Dementia / UTI     Discharge Plan: She will return home with her      DISCHARGE SUMMARY    [facility]  : 1950  MRN: [id number]    The patient Nba Thurman exhibits the ability to control behavior in a less restrictive environment. Patient's level of functioning is improving. No assaultive/destructive behavior has been observed for the past 24 hours. No suicidal/homicidal threat or behavior has been observed for the past 24 hours. There is no evidence of serious medication side effects.   Patient has not been in physical or protective restraints for at least the past 24 hours. If weapons involved, how are they secured? No weapons involved    Is patient aware of and in agreement with discharge plan?  is aware - patient is confused    Arrangements for medication:  Prescriptions given to patient. Copy of discharge instructions to  provider?:  Yes - sent to PCP    Arrangements for transportation home:   to     Keep all follow up appointments as scheduled, continue to take prescribed medications per physician instructions. Mental health crisis number:  [id number] or your local mental health crisis line number at [phone]        Discharge Medication List and Instructions:   Current Discharge Medication List      START taking these medications    Details   nitrofurantoin, macrocrystal-monohydrate, (MACROBID) 100 mg capsule Take 1 Cap by mouth two (2) times a day. Indications: BACTERIAL URINARY TRACT INFECTION  Qty: 6 Cap, Refills: 0         CONTINUE these medications which have NOT CHANGED    Details   glipiZIDE (GLUCOTROL) 5 mg tablet Take 5 mg by mouth daily. Indications: type 2 diabetes mellitus      multivitamin (ONE A DAY) tablet Take 1 Tab by mouth daily. FERROUS SULFATE (IRON PO) Take 1 Tab by mouth daily. aspirin 81 mg chewable tablet Take 81 mg by mouth daily. Indications: prevention of cerebrovascular accident      chlorproMAZINE (THORAZINE) 25 mg tablet Take 25 mg by mouth three (3) times daily. memantine-donepezil (NAMZARIC) 28-10 mg CSpX Take 1 Cap by mouth daily. Indications: DEMENTIA      atorvastatin (LIPITOR) 40 mg tablet Take 1 Tab by mouth daily.  Indications: PREVENTION OF CEREBROVASCULAR ACCIDENT  Qty: 15 Tab, Refills: 0             Unresulted Labs     None        To obtain results of studies pending at discharge, please contact [phone]     Follow-up Information     Follow up With Details Comments [address] [facility] [address] On 7/18/2017 you hae a 1:00 appointment with Slime Donis NP  [address] [facility]  [place] [id number]  [phone]          Advanced Directive:   Does the patient have an appointed surrogate decision maker? Yes  Does the patient have a Medical Advance Directive? No  Does the patient have a Psychiatric Advance Directive? No  If the patient does not have a surrogate or Medical Advance Directive AND Psychiatric Advance Directive, the patient was offered information on these advance directives Other  is her guardian     Patient Instructions: Please continue all medications until otherwise directed by physician. Tobacco Cessation Discharge Plan:   Is the patient a smoker and needs referral for smoking cessation? No  Patient referred to the following for smoking cessation with an appointment? Not applicable     Patient was offered medication to assist with smoking cessation at discharge? Not applicable  Was education for smoking cessation added to the discharge instructions? Not applicable    Alcohol/Substance Abuse Discharge Plan:   Does the patient have a history of substance/alcohol abuse and requires a referral for treatment? No  Patient referred to the following for substance/alcohol abuse treatment with an appointment? Not applicable  Patient was offered medication to assist with alcohol cessation at discharge? Not applicable  Was education for substance/alcohol abuse added to discharge instructions?  Not applicable    Patient discharged to Home; discussed with patient/caregiver

## 2017-07-11 NOTE — INTERDISCIPLINARY ROUNDS
Behavioral Health Interdisciplinary Rounds     Patient Name: Ajit Chu  Age: 74 y.o.   Room/Bed:  [facility]/  Primary Diagnosis: Dementia   Admission Status: Involuntary Commitment     Readmission within 30 days: no  Power of  in place: yes  Patient requires a blocked bed: yes          Reason for blocked bed: isolation    VTE Prophylaxis: Yes - ASA  Mobility needs/Fall risk: yes    Nutritional Plan: no  Consults: urology completed (call back if UTI symptoms reoccur)         Labs/Testing due today?: yes: collected     Sleep hours: 7       Participation in Care/Groups:  no  Medication Compliant?: Selective (accepted most)   PRNS (last 24 hours): None    Restraints (last 24 hours):  no  Substance Abuse:  no  CIWA (range last 24 hours):  COWS (range last 24 hours):   Alcohol screening (AUDIT) completed -  AUDIT Score: 0  If applicable, date SBIRT discussed in treatment team AND documented:   Tobacco - patient is a smoker: no   Date tobacco education completed by RN:   24 hour chart check complete: yes     Patient goal(s) for today:   Treatment team focus/goals:   LOS:  4  Expected LOS:   Psychiatric Advanced Care Directives -    Name of Decision maker if patient has Psychiatric Care Directive   Patient was offered information   Financial concerns/prescription coverage:    Date of last family contact:       Family requesting physician contact today:    Discharge plan:        Outpatient provider(s):     Participating treatment team members: Ajit Chu, * (assigned SW),

## 2017-07-11 NOTE — PROGRESS NOTES
Laboratory Monitoring for Antipsychotics and Mood Stabilizers    This patient is currently prescribed the following medication(s):   Current Facility-Administered Medications   Medication Dose Route Frequency    nitrofurantoin (macrocrystal-monohydrate) (MACROBID) capsule 100 mg  100 mg Oral BID    atorvastatin (LIPITOR) tablet 40 mg  40 mg Oral DAILY    aspirin chewable tablet 81 mg  81 mg Oral DAILY    therapeutic multivitamin (THERAGRAN) tablet 1 Tab  1 Tab Oral DAILY    ferrous sulfate tablet 325 mg  325 mg Oral DAILY WITH BREAKFAST    insulin lispro (HUMALOG) injection   SubCUTAneous AC&HS    glipiZIDE (GLUCOTROL) tablet 5 mg  5 mg Oral DAILY    donepezil (ARICEPT) tablet 10 mg  10 mg Oral QHS    memantine (NAMENDA) tablet 10 mg  10 mg Oral BID    chlorproMAZINE (THORAZINE) tablet 25 mg  25 mg Oral BID       The following labs have been completed for monitoring of antipsychotics and/or mood stabilizers:    Height, Weight, BMI Estimation  Estimated body mass index is 28.24 kg/(m^2) as calculated from the following:    Height as of 10/26/16: 157.5 cm (62\"). Weight as of this encounter: 70 kg (154 lb 6.4 oz). Vital Signs/Blood Pressure  Visit Vitals    /82    Pulse 96    Temp 97.5 °F (36.4 °C)    Resp 16    Ht 157.5 cm (62\")    Wt 70 kg (154 lb 6.4 oz)    SpO2 97%    Breastfeeding No    BMI 28.24 kg/m2       Renal Function, Hepatic Function and Chemistry  Estimated Creatinine Clearance: 55.5 mL/min (based on Cr of 0.89).     Lab Results   Component Value Date/Time    Sodium 143 07/11/2017 07:00 AM    Potassium 3.8 07/11/2017 07:00 AM    Chloride 106 07/11/2017 07:00 AM    CO2 28 07/11/2017 07:00 AM    Anion gap 9 07/11/2017 07:00 AM    BUN 13 07/11/2017 07:00 AM    Creatinine 0.89 07/11/2017 07:00 AM    BUN/Creatinine ratio 15 07/11/2017 07:00 AM    Bilirubin, total 0.7 07/11/2017 07:00 AM    Protein, total 6.1 07/11/2017 07:00 AM    Albumin 3.3 07/11/2017 07:00 AM    Globulin 2.8 07/11/2017 07:00 AM    A-G Ratio 1.2 07/11/2017 07:00 AM    ALT (SGPT) 26 07/11/2017 07:00 AM    Alk. phosphatase 99 07/11/2017 07:00 AM       Lab Results   Component Value Date/Time    Glucose 135 07/11/2017 07:00 AM    Glucose 135 07/11/2017 07:00 AM    Glucose (POC) 130 07/11/2017 11:54 AM       Lab Results   Component Value Date/Time    Hemoglobin A1c 6.8 10/20/2016 06:51 AM       Hematology  Lab Results   Component Value Date/Time    WBC 6.8 11/13/2016 03:54 PM    RBC 4.49 11/13/2016 03:54 PM    HGB 13.1 11/13/2016 03:54 PM    HCT 39.9 11/13/2016 03:54 PM    MCV 88.9 11/13/2016 03:54 PM    MCH 29.2 11/13/2016 03:54 PM    MCHC 32.8 11/13/2016 03:54 PM    RDW 13.2 11/13/2016 03:54 PM    PLATELET 419 79/10/0787 03:54 PM       Lipids  Lab Results   Component Value Date/Time    Cholesterol, total 224 10/22/2016 06:28 AM    HDL Cholesterol 36 10/22/2016 06:28 AM    LDL, calculated 150.6 10/22/2016 06:28 AM    Triglyceride 187 10/22/2016 06:28 AM    CHOL/HDL Ratio 6.2 10/22/2016 06:28 AM       Thyroid Function  Lab Results   Component Value Date/Time    TSH 0.84 07/11/2017 07:00 AM       Assessment/Plan:  Recommended baseline laboratory monitoring has been completed based on this patient's current medication regimen.          Mely Aiken, PharmD, [address], BCPS  [id number]

## 2017-07-11 NOTE — BH NOTES
GROUP THERAPY PROGRESS NOTE    [facility] participated in a Morning Process Group on the Geriatric Unit, with a focus identifying feelings, planning for the day, and singing. Group time: 40 minutes. Personal goal for participation: To increase the capacity to shift ones mood, prepare for the day, and share in group singing. Goal orientation: The patient will be able to prepare for the day through group singing. Group therapy participation: When prompted, this patient partially participated in the group. Therapeutic interventions reviewed and discussed: The group members were introduce themselves by first names and participate in group singing as a way to increase their oxygen and blood flow and begin their day on a positive note. They were also asked to join in singing several songs. Impression of participation: The patient said was feeling \"good\" and that she had a stuffed nose, which she blamed on pollen. She also acknowledged thinking about her flower garden. She listened to but did not join into the singing. The patient expressed no SI/HI and no overt psychosis. Her affect was depressed and her mood was calm. She needed to be prompted to respond in group. She acted tired and closed her eyes through some of the group.

## 2017-07-11 NOTE — DISCHARGE SUMMARY
PSYCHIATRIC DISCHARGE SUMMARY         IDENTIFICATION:    Patient Name  Barrington Mendez   Date of Birth 1950   [facility] [id number]   Medical Record Number  [id number]      Age  74 y.o. PCP Suzanne Santana MD   Admit date:  7/7/2017    Discharge date: 7/11/2017   Room Number  748/01  [facility]   Date of Service  7/11/2017               TYPE OF DISCHARGE: REGULAR               CONDITION AT DISCHARGE: good and improved       PROVISIONAL & DISCHARGE DIAGNOSES:    Problem List  Date Reviewed: 10/20/2016          Codes Class    Schizoaffective disorder (HCC) ICD-10-CM: F25.9  ICD-9-CM: 295.70         UTI (urinary tract infection) ICD-10-CM: N39.0  ICD-9-CM: 599.0         Hypertension ICD-10-CM: I10  ICD-9-CM: 401.9         * (Principal)Dementia ICD-10-CM: F03.90  ICD-9-CM: 294.20               Active Hospital Problems    Schizoaffective disorder ([address]      UTI (urinary tract infection)      *Dementia        DISCHARGE DIAGNOSIS:   Axis I:  SEE ABOVE  Axis II: SEE ABOVE  Axis III: SEE ABOVE  Axis IV:  lack of structure  Axis V:  60 on admission, 60 on discharge 60(baseline)       CC & HISTORY OF PRESENT ILLNESS:  74year old  female admitted for aggressive behavior, resulting from an acute urinary tract infection. , who is the patient's legal guardian for all medical decision making instructed the treatment team to NOT CHANGE ANY of the patients pre admission medications. He did, however, give consent for the patient to be treatted with Macribid for the urinary tract infection. At dischege the patient had a normal urinalysis, and denied SI/HI intent and plan. Behaviorally she was ay baseline. SOCIAL HISTORY:    Social History     Social History    Marital status:      Spouse name: N/A    Number of children: N/A    Years of education: N/A     Occupational History    Not on file.      Social History Main Topics    Smoking status: Never Smoker    Smokeless tobacco: Never Used    Alcohol use No    Drug use: No    Sexual activity: Yes     Partners: Male     Other Topics Concern    Not on file     Social History Narrative    [id number] IMPAIRMENT IN THE CONTEXT OF WORSENING CEREBROVASCULAR DEMENTIA. Patient has had acute mental status change in addition, most likely due to UTI. During past [facility] admissions, 's family, Nursing Director, Social Work, Patient Advocate and her attending psychiatirst ALL concurred that patient needed placement in a SNF. Patient's  was against this recommendation because he believes the patient only needs psychotherapy, and doies not need medications. FAMILY HISTORY:   Family History   Problem Relation Age of Onset    Diabetes Mother     Diabetes Father              HOSPITALIZATION COURSE:    Ulysses Sandoval was admitted to the inpatient psychiatric unit [facility] for acute psychiatric stabilization in regards to symptomatology as described in the HPI above. The differential diagnosis at time of admission included: dementia  While on the unit Ulysses Sandoval was involved in individual, group, occupational and milieu therapy. Psychiatric medications were adjusted during this hospitalization including macrobid. Ulysses Sandoval demonstrated a slow, but progressive improvement in overall condition. Much of patient's depression appeared to be related to situational stressors and psychological factors. Please see individual progress notes for more specific details regarding patient's hospitalization course. At time of dc, Rita Medeiros Marisol Leiva was without significant problems with depression psychosis  nila. Overall presentation at time of discharge is most consistent with the diagnosis of dementia Patient with request for discharge today. There are no grounds to seek a TDO.  Patient has maximized benefit to be derived from acute inpatient psychiatric treatment.   All members of the treatment team concur with each other in regards to plans for discharge today per patient's request.          LABS AND IMAGAING:    Labs Reviewed   URINALYSIS W/MICROSCOPIC - Abnormal; Notable for the following:        Result Value    Appearance CLOUDY (*)     Leukocyte Esterase TRACE (*)     Epithelial cells MODERATE (*)     All other components within normal limits   GLUCOSE, FASTING - Abnormal; Notable for the following:     Glucose 135 (*)     All other components within normal limits   METABOLIC PANEL, COMPREHENSIVE - Abnormal; Notable for the following:     Glucose 135 (*)     Protein, total 6.1 (*)     Albumin 3.3 (*)     All other components within normal limits   GLUCOSE, POC - Abnormal; Notable for the following:     Glucose (POC) 141 (*)     All other components within normal limits   GLUCOSE, POC - Abnormal; Notable for the following:     Glucose (POC) 142 (*)     All other components within normal limits   GLUCOSE, POC - Abnormal; Notable for the following:     Glucose (POC) 104 (*)     All other components within normal limits   GLUCOSE, POC - Abnormal; Notable for the following:     Glucose (POC) 148 (*)     All other components within normal limits   GLUCOSE, POC - Abnormal; Notable for the following:     Glucose (POC) 158 (*)     All other components within normal limits   GLUCOSE, POC - Abnormal; Notable for the following:     Glucose (POC) 150 (*)     All other components within normal limits   GLUCOSE, POC - Abnormal; Notable for the following:     Glucose (POC) 161 (*)     All other components within normal limits   GLUCOSE, POC - Abnormal; Notable for the following:     Glucose (POC) 138 (*)     All other components within normal limits   GLUCOSE, POC - Abnormal; Notable for the following:     Glucose (POC) 217 (*)     All other components within normal limits   GLUCOSE, POC - Abnormal; Notable for the following:     Glucose (POC) 138 (*)     All other components within normal limits   GLUCOSE, POC - Abnormal; Notable for the following:     Glucose (POC) 130 (*)     All other components within normal limits   TSH 3RD GENERATION   GLUCOSE, POC   POC GLUCOSE   POC GLUCOSE   POC GLUCOSE   POC GLUCOSE   POC GLUCOSE     Admission on 07/07/2017   Component Date Value Ref Range Status    Glucose (POC) 07/07/2017 141* 65 - 100 mg/dL Final    Performed by 07/07/2017 Ruddy Sara   Final    Glucose (POC) 07/08/2017 142* 65 - 100 mg/dL Final    Performed by 07/08/2017 GIDEON  PAULINA   Final    Glucose (POC) 07/08/2017 104* 65 - 100 mg/dL Final    Performed by 07/08/2017 VERN PACE   Final    Glucose (POC) 07/09/2017 148* 65 - 100 mg/dL Final    Performed by 07/09/2017 JESSE MARÍA   Final    Glucose (POC) 07/09/2017 158* 65 - 100 mg/dL Final    Performed by 07/09/2017 JESSE MARÍA   Final    Glucose (POC) 07/09/2017 150* 65 - 100 mg/dL Final    Performed by 07/09/2017 VERN Tinajero   Final    Glucose (POC) 07/10/2017 161* 65 - 100 mg/dL Final    Performed by 07/10/2017 Ariana CAMARGO   Final    Glucose (POC) 07/10/2017 138* 65 - 100 mg/dL Final    Performed by 07/10/2017 Reinaldo BOWEN   Final    Color 07/10/2017 YELLOW/STRAW    Final    Appearance 07/10/2017 CLOUDY* CLEAR   Final    Specific gravity 07/10/2017 1.012  1.003 - 1.030   Final    pH (UA) 07/10/2017 5.5  5.0 - 8.0   Final    Protein 07/10/2017 NEGATIVE   NEG mg/dL Final    Glucose 07/10/2017 NEGATIVE   NEG mg/dL Final    Ketone 07/10/2017 NEGATIVE   NEG mg/dL Final    Bilirubin 07/10/2017 NEGATIVE   NEG   Final    Blood 07/10/2017 NEGATIVE   NEG   Final    Urobilinogen 07/10/2017 0.2  0.2 - 1.0 EU/dL Final    Nitrites 07/10/2017 NEGATIVE   NEG   Final    Leukocyte Esterase 07/10/2017 TRACE* NEG   Final    WBC 07/10/2017 0-4  0 - 4 /hpf Final    RBC 07/10/2017 0-5  0 - 5 /hpf Final    Epithelial cells 07/10/2017 MODERATE* FEW /lpf Final   Phylicia He Bacteria 07/10/2017 NEGATIVE   NEG /hpf Final    Glucose (POC) 07/10/2017 217* 65 - 100 mg/dL Final    Performed by 07/10/2017 Jazmine Negrete   Final    Glucose (POC) 07/10/2017 89  65 - 100 mg/dL Final    Performed by 07/10/2017 Jazmine Negrete   Final    Glucose 07/11/2017 135* 65 - 100 MG/DL Final    Sodium 07/11/2017 143  136 - 145 mmol/L Final    Potassium 07/11/2017 3.8  3.5 - 5.1 mmol/L Final    Chloride 07/11/2017 106  97 - 108 mmol/L Final    CO2 07/11/2017 28  21 - 32 mmol/L Final    Anion gap 07/11/2017 9  5 - 15 mmol/L Final    Glucose 07/11/2017 135* 65 - 100 mg/dL Final    BUN 07/11/2017 13  6 - 20 MG/DL Final    Creatinine 07/11/2017 0.89  0.55 - 1.02 MG/DL Final    BUN/Creatinine ratio 07/11/2017 15  12 - 20   Final    GFR est AA 07/11/2017 >60  >60 ml/min/1.73m2 Final    GFR est non-AA 07/11/2017 >60  >60 ml/min/1.73m2 Final    Calcium 07/11/2017 8.9  8.5 - 10.1 MG/DL Final    Bilirubin, total 07/11/2017 0.7  0.2 - 1.0 MG/DL Final    ALT (SGPT) 07/11/2017 26  12 - 78 U/L Final    AST (SGOT) 07/11/2017 22  15 - 37 U/L Final    Alk. phosphatase 07/11/2017 99  45 - 117 U/L Final    Protein, total 07/11/2017 6.1* 6.4 - 8.2 g/dL Final    Albumin 07/11/2017 3.3* 3.5 - 5.0 g/dL Final    Globulin 07/11/2017 2.8  2.0 - 4.0 g/dL Final    A-G Ratio 07/11/2017 1.2  1.1 - 2.2   Final    TSH 07/11/2017 0.84  0.36 - 3.74 uIU/mL Final    Glucose (POC) 07/11/2017 138* 65 - 100 mg/dL Final    Performed by 07/11/2017 [facility] Rickia   Final    Glucose (POC) 07/11/2017 130* 65 - 100 mg/dL Final    Performed by 07/11/2017 Lynn Vega   Final     No results found. DISPOSITION:    Home. Patient to f/u with o/p psychiatric,  psychotherapy appointments. Patient is to f/u with internist as directed. FOLLOW-UP CARE:    Activity as tolerated  Regular Diet  Wound Care: none needed.   Follow-up Information     Follow up With Details Comments [address] Vivek Maloney MD   [address]  [address]  [phone]                   PROGNOSIS:  Greatly dependent upon patient's ability  to f/u psychiatric/psychotherapy appointments as well as to comply with psychiatric medications as prescribed. Patient denies suicidal or homicidal ideations. Mary Sapp fully contracts for safety. Patient reports many positive predictive factors in terms of not attempting suicide or homicide. Patient appears to be at low risk of suicide or homicide. Patient and family are aware and in agreement with discharge and discharge plan. DISCHARGE MEDICATIONS: (no changes made). Informed consent given for the use of following psychotropic medications:  Current Discharge Medication List      START taking these medications    Details   nitrofurantoin, macrocrystal-monohydrate, (MACROBID) 100 mg capsule Take 1 Cap by mouth two (2) times a day. Indications: BACTERIAL URINARY TRACT INFECTION  Qty: 6 Cap, Refills: 0         CONTINUE these medications which have CHANGED    Details   !! aspirin 81 mg chewable tablet Take 1 Tab by mouth daily. Indications: anxiety  Qty: 30 Tab, Refills: 0       !! - Potential duplicate medications found. Please discuss with provider. CONTINUE these medications which have NOT CHANGED    Details   glipiZIDE (GLUCOTROL) 5 mg tablet Take 5 mg by mouth daily. Indications: type 2 diabetes mellitus      multivitamin (ONE A DAY) tablet Take 1 Tab by mouth daily. FERROUS SULFATE (IRON PO) Take 1 Tab by mouth daily. !! aspirin 81 mg chewable tablet Take 81 mg by mouth daily. Indications: prevention of cerebrovascular accident      chlorproMAZINE (THORAZINE) 25 mg tablet Take 25 mg by mouth three (3) times daily. memantine-donepezil (NAMZARIC) 28-10 mg CSpX Take 1 Cap by mouth daily. Indications: DEMENTIA      atorvastatin (LIPITOR) 40 mg tablet Take 1 Tab by mouth daily.  Indications: PREVENTION OF CEREBROVASCULAR ACCIDENT  Qty: 15 Tab, Refills: 0       !! - Potential duplicate medications found. Please discuss with provider. A coordinated, multidisplinary treatment team round was conducted with Audie Orlando [facility] is done daily here at [facility] . This team consists of the nurse, psychiatric unit pharmcist,  and writer. I have spent greater than 35 minutes on discharge work.     Signed:  De Bhatia MD  7/11/2017

## 2017-07-11 NOTE — DISCHARGE INSTRUCTIONS
DISCHARGE SUMMARY    Charles Mijares  : 1950  MRN: [id number]    The patient Silvia Mi exhibits the ability to control behavior in a less restrictive environment. Patient's level of functioning is improving. No assaultive/destructive behavior has been observed for the past 24 hours. No suicidal/homicidal threat or behavior has been observed for the past 24 hours. There is no evidence of serious medication side effects. Patient has not been in physical or protective restraints for at least the past 24 hours. If weapons involved, how are they secured? No weapons involved    Is patient aware of and in agreement with discharge plan?  is aware - patient is confused    Arrangements for medication:  Prescriptions given to patient. Copy of discharge instructions to  provider?:  Yes - sent to PCP    Arrangements for transportation home:   to     Keep all follow up appointments as scheduled, continue to take prescribed medications per physician instructions. Mental health crisis number:  [phone] or your local mental health crisis line number at [facility] from Nurse    The following personal items are in your possession at time of discharge:    Dental Appliances: None  Visual Aid: Glasses, With patient     Home Medications: None  Jewelry: None  Clothing: None  Other Valuables: Personal toiletries (shampoo)  Personal Items Sent to Safe: None          PATIENT INSTRUCTIONS:    What to do at Home:  Recommended activity: Activity as tolerated. If you experience any of the following symptoms:  Overwhelming anxiety or depression, thoughts of hurting yourself or others, please follow up with 911 or your local mental health crisis line number at [phone]. *  Please give a list of your current medications to your Primary Care Provider.     *  Please update this list whenever your medications are discontinued, doses are      changed, or new medications (including over-the-counter products) are added. *  Please carry medication information at all times in case of emergency situations. These are general instructions for a healthy lifestyle:    No smoking/ No tobacco products/ Avoid exposure to second hand smoke    Surgeon General's Warning:  Quitting smoking now greatly reduces serious risk to your health. Obesity, smoking, and sedentary lifestyle greatly increases your risk for illness    A healthy diet, regular physical exercise & weight monitoring are important for maintaining a healthy lifestyle    You may be retaining fluid if you have a history of heart failure or if you experience any of the following symptoms:  Weight gain of 3 pounds or more overnight or 5 pounds in a week, increased swelling in our hands or feet or shortness of breath while lying flat in bed. Please call your doctor as soon as you notice any of these symptoms; do not wait until your next office visit. Recognize signs and symptoms of STROKE:    F-face looks uneven    A-arms unable to move or move unevenly    S-speech slurred or non-existent    T-time-call 911 as soon as signs and symptoms begin-DO NOT go       Back to bed or wait to see if you get better-TIME IS BRAIN. Warning Signs of HEART ATTACK     Call 911 if you have these symptoms:   Chest discomfort. Most heart attacks involve discomfort in the center of the chest that lasts more than a few minutes, or that goes away and comes back. It can feel like uncomfortable pressure, squeezing, fullness, or pain.  Discomfort in other areas of the upper body. Symptoms can include pain or discomfort in one or both arms, the back, neck, jaw, or stomach.  Shortness of breath with or without chest discomfort.  Other signs may include breaking out in a cold sweat, nausea, or lightheadedness. Don't wait more than five minutes to call 911 - MINUTES MATTER! Fast action can save your life.  Calling 911 is almost always the fastest way to get lifesaving treatment. Emergency Medical Services staff can begin treatment when they arrive -- up to an hour sooner than if someone gets to the hospital by car. The discharge information has been reviewed with the patient and spouse. The spouse verbalized understanding. Discharge medications reviewed with the spouse and appropriate educational materials and side effects teaching were provided.

## 2017-07-11 NOTE — BH NOTES
2315 Pt appears asleep in recliner in dining room. Respirations even and unlabored. In sight of nurse's station. Will coninue to monitor with Q 15 safety checks. 0650 Pt compliant with AM labs. Pt remained calm and pleasant through night. Up once to bathroom.

## 2017-07-11 NOTE — BH NOTES
Dr. Carma Schirmer called for pt . Order received to give 7 units lispro per sliding scale. 1730:  Pt discharged home with , prescriptions, and all belongings. Discharge instructions and opportunities for clarification provided to . Pt and  were escorted to entrance by staff with pt in wheelchair. NAD.
